# Patient Record
Sex: FEMALE | Race: WHITE | Employment: OTHER | URBAN - METROPOLITAN AREA
[De-identification: names, ages, dates, MRNs, and addresses within clinical notes are randomized per-mention and may not be internally consistent; named-entity substitution may affect disease eponyms.]

---

## 2019-11-24 ENCOUNTER — HOSPITAL ENCOUNTER (INPATIENT)
Age: 84
LOS: 3 days | Discharge: HOME HEALTH | DRG: 291 | End: 2019-11-27
Attending: EMERGENCY MEDICINE | Admitting: HOSPITALIST
Payer: MEDICARE

## 2019-11-24 ENCOUNTER — APPOINTMENT (OUTPATIENT)
Dept: GENERAL RADIOLOGY | Age: 84
DRG: 291 | End: 2019-11-24
Attending: EMERGENCY MEDICINE
Payer: MEDICARE

## 2019-11-24 ENCOUNTER — APPOINTMENT (OUTPATIENT)
Dept: CT IMAGING | Age: 84
DRG: 291 | End: 2019-11-24
Attending: HOSPITALIST
Payer: MEDICARE

## 2019-11-24 DIAGNOSIS — I50.9 ACUTE ON CHRONIC CONGESTIVE HEART FAILURE, UNSPECIFIED HEART FAILURE TYPE (HCC): Primary | ICD-10-CM

## 2019-11-24 DIAGNOSIS — R19.7 DIARRHEA, UNSPECIFIED TYPE: ICD-10-CM

## 2019-11-24 DIAGNOSIS — N30.00 ACUTE CYSTITIS WITHOUT HEMATURIA: ICD-10-CM

## 2019-11-24 DIAGNOSIS — J18.9 PNEUMONIA OF RIGHT LOWER LOBE DUE TO INFECTIOUS ORGANISM: ICD-10-CM

## 2019-11-24 PROBLEM — R77.8 ELEVATED TROPONIN: Status: ACTIVE | Noted: 2019-11-24

## 2019-11-24 PROBLEM — R79.89 ELEVATED BRAIN NATRIURETIC PEPTIDE (BNP) LEVEL: Status: ACTIVE | Noted: 2019-11-24

## 2019-11-24 PROBLEM — N39.0 UTI (URINARY TRACT INFECTION): Status: ACTIVE | Noted: 2019-11-24

## 2019-11-24 PROBLEM — N18.30 STAGE 3 CHRONIC KIDNEY DISEASE (HCC): Status: ACTIVE | Noted: 2019-11-24

## 2019-11-24 PROBLEM — E03.9 ADULT HYPOTHYROIDISM: Status: ACTIVE | Noted: 2019-11-24

## 2019-11-24 PROBLEM — I10 HYPERTENSION: Status: ACTIVE | Noted: 2019-11-24

## 2019-11-24 LAB
ALBUMIN SERPL-MCNC: 2.8 G/DL (ref 3.4–5)
ALBUMIN/GLOB SERPL: 0.7 {RATIO} (ref 0.8–1.7)
ALP SERPL-CCNC: 101 U/L (ref 45–117)
ALT SERPL-CCNC: 18 U/L (ref 13–56)
AMORPH CRY URNS QL MICRO: ABNORMAL
ANION GAP SERPL CALC-SCNC: 13 MMOL/L (ref 3–18)
APPEARANCE UR: ABNORMAL
AST SERPL-CCNC: 30 U/L (ref 10–38)
BACTERIA URNS QL MICRO: ABNORMAL /HPF
BASOPHILS # BLD: 0 K/UL (ref 0–0.1)
BASOPHILS NFR BLD: 0 % (ref 0–2)
BILIRUB SERPL-MCNC: 0.6 MG/DL (ref 0.2–1)
BILIRUB UR QL: NEGATIVE
BNP SERPL-MCNC: 5047 PG/ML (ref 0–1800)
BUN SERPL-MCNC: 24 MG/DL (ref 7–18)
BUN/CREAT SERPL: 13 (ref 12–20)
CALCIUM SERPL-MCNC: 9.2 MG/DL (ref 8.5–10.1)
CHLORIDE SERPL-SCNC: 97 MMOL/L (ref 100–111)
CK MB CFR SERPL CALC: 1.6 % (ref 0–4)
CK MB CFR SERPL CALC: 2.1 % (ref 0–4)
CK MB SERPL-MCNC: 2.3 NG/ML (ref 5–25)
CK MB SERPL-MCNC: 2.9 NG/ML (ref 5–25)
CK SERPL-CCNC: 139 U/L (ref 26–192)
CK SERPL-CCNC: 147 U/L (ref 26–192)
CO2 SERPL-SCNC: 21 MMOL/L (ref 21–32)
COLOR UR: ABNORMAL
CREAT SERPL-MCNC: 1.86 MG/DL (ref 0.6–1.3)
D DIMER PPP FEU-MCNC: 3.95 UG/ML(FEU)
DIFFERENTIAL METHOD BLD: ABNORMAL
EOSINOPHIL # BLD: 0 K/UL (ref 0–0.4)
EOSINOPHIL NFR BLD: 0 % (ref 0–5)
EPITH CASTS URNS QL MICRO: ABNORMAL /LPF (ref 0–5)
ERYTHROCYTE [DISTWIDTH] IN BLOOD BY AUTOMATED COUNT: 13.1 % (ref 11.6–14.5)
GLOBULIN SER CALC-MCNC: 4.3 G/DL (ref 2–4)
GLUCOSE SERPL-MCNC: 145 MG/DL (ref 74–99)
GLUCOSE UR STRIP.AUTO-MCNC: NEGATIVE MG/DL
GRAN CASTS URNS QL MICRO: ABNORMAL /LPF
HCT VFR BLD AUTO: 37.6 % (ref 35–45)
HGB BLD-MCNC: 12.6 G/DL (ref 12–16)
HGB UR QL STRIP: ABNORMAL
HYALINE CASTS URNS QL MICRO: ABNORMAL /LPF (ref 0–2)
KETONES UR QL STRIP.AUTO: NEGATIVE MG/DL
LEUKOCYTE ESTERASE UR QL STRIP.AUTO: ABNORMAL
LYMPHOCYTES # BLD: 0.7 K/UL (ref 0.9–3.6)
LYMPHOCYTES NFR BLD: 4 % (ref 21–52)
MCH RBC QN AUTO: 30.8 PG (ref 24–34)
MCHC RBC AUTO-ENTMCNC: 33.5 G/DL (ref 31–37)
MCV RBC AUTO: 91.9 FL (ref 74–97)
MONOCYTES # BLD: 1.4 K/UL (ref 0.05–1.2)
MONOCYTES NFR BLD: 8 % (ref 3–10)
NEUTS SEG # BLD: 16.3 K/UL (ref 1.8–8)
NEUTS SEG NFR BLD: 88 % (ref 40–73)
NITRITE UR QL STRIP.AUTO: NEGATIVE
PH UR STRIP: 5.5 [PH] (ref 5–8)
PLATELET # BLD AUTO: 362 K/UL (ref 135–420)
PMV BLD AUTO: 10.3 FL (ref 9.2–11.8)
POTASSIUM SERPL-SCNC: 3.5 MMOL/L (ref 3.5–5.5)
PROT SERPL-MCNC: 7.1 G/DL (ref 6.4–8.2)
PROT UR STRIP-MCNC: 300 MG/DL
RBC # BLD AUTO: 4.09 M/UL (ref 4.2–5.3)
RBC #/AREA URNS HPF: ABNORMAL /HPF (ref 0–5)
SODIUM SERPL-SCNC: 131 MMOL/L (ref 136–145)
SP GR UR REFRACTOMETRY: 1.02 (ref 1–1.03)
TROPONIN I SERPL-MCNC: 0.04 NG/ML (ref 0–0.04)
TROPONIN I SERPL-MCNC: 0.05 NG/ML (ref 0–0.04)
UROBILINOGEN UR QL STRIP.AUTO: 1 EU/DL (ref 0.2–1)
WBC # BLD AUTO: 18.4 K/UL (ref 4.6–13.2)
WBC URNS QL MICRO: ABNORMAL /HPF (ref 0–5)

## 2019-11-24 PROCEDURE — 80053 COMPREHEN METABOLIC PANEL: CPT

## 2019-11-24 PROCEDURE — 82550 ASSAY OF CK (CPK): CPT

## 2019-11-24 PROCEDURE — 65660000000 HC RM CCU STEPDOWN

## 2019-11-24 PROCEDURE — 74011250637 HC RX REV CODE- 250/637: Performed by: EMERGENCY MEDICINE

## 2019-11-24 PROCEDURE — 93005 ELECTROCARDIOGRAM TRACING: CPT

## 2019-11-24 PROCEDURE — 94762 N-INVAS EAR/PLS OXIMTRY CONT: CPT

## 2019-11-24 PROCEDURE — 85379 FIBRIN DEGRADATION QUANT: CPT

## 2019-11-24 PROCEDURE — 74011000258 HC RX REV CODE- 258: Performed by: HOSPITALIST

## 2019-11-24 PROCEDURE — 74011250637 HC RX REV CODE- 250/637: Performed by: HOSPITALIST

## 2019-11-24 PROCEDURE — 74011250636 HC RX REV CODE- 250/636: Performed by: HOSPITALIST

## 2019-11-24 PROCEDURE — 81001 URINALYSIS AUTO W/SCOPE: CPT

## 2019-11-24 PROCEDURE — 36415 COLL VENOUS BLD VENIPUNCTURE: CPT

## 2019-11-24 PROCEDURE — 87045 FECES CULTURE AEROBIC BACT: CPT

## 2019-11-24 PROCEDURE — 71045 X-RAY EXAM CHEST 1 VIEW: CPT

## 2019-11-24 PROCEDURE — 71250 CT THORAX DX C-: CPT

## 2019-11-24 PROCEDURE — 85025 COMPLETE CBC W/AUTO DIFF WBC: CPT

## 2019-11-24 PROCEDURE — 99285 EMERGENCY DEPT VISIT HI MDM: CPT

## 2019-11-24 PROCEDURE — 83880 ASSAY OF NATRIURETIC PEPTIDE: CPT

## 2019-11-24 PROCEDURE — 74011250636 HC RX REV CODE- 250/636: Performed by: EMERGENCY MEDICINE

## 2019-11-24 RX ORDER — IPRATROPIUM BROMIDE AND ALBUTEROL SULFATE 2.5; .5 MG/3ML; MG/3ML
3 SOLUTION RESPIRATORY (INHALATION)
Status: DISCONTINUED | OUTPATIENT
Start: 2019-11-24 | End: 2019-11-27 | Stop reason: HOSPADM

## 2019-11-24 RX ORDER — FERROUS SULFATE, DRIED 160(50) MG
1 TABLET, EXTENDED RELEASE ORAL 2 TIMES DAILY
Status: DISCONTINUED | OUTPATIENT
Start: 2019-11-24 | End: 2019-11-27 | Stop reason: HOSPADM

## 2019-11-24 RX ORDER — TRANDOLAPRIL AND VERAPAMIL HYDROCHLORIDE 4; 240 MG/1; MG/1
1 TABLET, FILM COATED, EXTENDED RELEASE ORAL 2 TIMES DAILY
COMMUNITY
End: 2019-11-27

## 2019-11-24 RX ORDER — FUROSEMIDE 10 MG/ML
40 INJECTION INTRAMUSCULAR; INTRAVENOUS
Status: COMPLETED | OUTPATIENT
Start: 2019-11-24 | End: 2019-11-24

## 2019-11-24 RX ORDER — LEVOTHYROXINE SODIUM 100 UG/1
100 TABLET ORAL
COMMUNITY

## 2019-11-24 RX ORDER — AMLODIPINE BESYLATE 5 MG/1
10 TABLET ORAL DAILY
Status: DISCONTINUED | OUTPATIENT
Start: 2019-11-24 | End: 2019-11-27 | Stop reason: HOSPADM

## 2019-11-24 RX ORDER — NALOXONE HYDROCHLORIDE 0.4 MG/ML
0.4 INJECTION, SOLUTION INTRAMUSCULAR; INTRAVENOUS; SUBCUTANEOUS AS NEEDED
Status: DISCONTINUED | OUTPATIENT
Start: 2019-11-24 | End: 2019-11-27 | Stop reason: HOSPADM

## 2019-11-24 RX ORDER — LORATADINE 10 MG/1
10 TABLET ORAL DAILY
COMMUNITY

## 2019-11-24 RX ORDER — GABAPENTIN 100 MG/1
200 CAPSULE ORAL 3 TIMES DAILY
Status: DISCONTINUED | OUTPATIENT
Start: 2019-11-24 | End: 2019-11-27 | Stop reason: HOSPADM

## 2019-11-24 RX ORDER — FAMOTIDINE 20 MG/1
40 TABLET, FILM COATED ORAL 2 TIMES DAILY
Status: DISCONTINUED | OUTPATIENT
Start: 2019-11-24 | End: 2019-11-27 | Stop reason: HOSPADM

## 2019-11-24 RX ORDER — HEPARIN SODIUM 5000 [USP'U]/ML
5000 INJECTION, SOLUTION INTRAVENOUS; SUBCUTANEOUS EVERY 8 HOURS
Status: DISCONTINUED | OUTPATIENT
Start: 2019-11-24 | End: 2019-11-27 | Stop reason: HOSPADM

## 2019-11-24 RX ORDER — FUROSEMIDE 40 MG/1
40 TABLET ORAL DAILY
COMMUNITY
End: 2019-11-27

## 2019-11-24 RX ORDER — DIPHENHYDRAMINE HYDROCHLORIDE 50 MG/ML
12.5 INJECTION, SOLUTION INTRAMUSCULAR; INTRAVENOUS
Status: DISCONTINUED | OUTPATIENT
Start: 2019-11-24 | End: 2019-11-27 | Stop reason: HOSPADM

## 2019-11-24 RX ORDER — ACETAMINOPHEN 500 MG
500 TABLET ORAL
Status: COMPLETED | OUTPATIENT
Start: 2019-11-24 | End: 2019-11-24

## 2019-11-24 RX ORDER — SODIUM CHLORIDE 9 MG/ML
50 INJECTION, SOLUTION INTRAVENOUS CONTINUOUS
Status: DISCONTINUED | OUTPATIENT
Start: 2019-11-24 | End: 2019-11-25

## 2019-11-24 RX ORDER — LEVOTHYROXINE SODIUM 100 UG/1
100 TABLET ORAL
Status: DISCONTINUED | OUTPATIENT
Start: 2019-11-25 | End: 2019-11-27 | Stop reason: HOSPADM

## 2019-11-24 RX ORDER — ONDANSETRON 2 MG/ML
4 INJECTION INTRAMUSCULAR; INTRAVENOUS
Status: DISCONTINUED | OUTPATIENT
Start: 2019-11-24 | End: 2019-11-27 | Stop reason: HOSPADM

## 2019-11-24 RX ORDER — ACETAMINOPHEN 325 MG/1
650 TABLET ORAL
Status: DISCONTINUED | OUTPATIENT
Start: 2019-11-24 | End: 2019-11-27 | Stop reason: HOSPADM

## 2019-11-24 RX ORDER — MULTIVITAMIN
1 TABLET ORAL 2 TIMES DAILY
COMMUNITY

## 2019-11-24 RX ORDER — GABAPENTIN 100 MG/1
200 CAPSULE ORAL 3 TIMES DAILY
COMMUNITY

## 2019-11-24 RX ORDER — FAMOTIDINE 20 MG/1
40 TABLET, FILM COATED ORAL 2 TIMES DAILY
COMMUNITY

## 2019-11-24 RX ADMIN — Medication 1 TABLET: at 21:33

## 2019-11-24 RX ADMIN — GABAPENTIN 200 MG: 100 CAPSULE ORAL at 21:33

## 2019-11-24 RX ADMIN — HEPARIN SODIUM 5000 UNITS: 5000 INJECTION INTRAVENOUS; SUBCUTANEOUS at 21:34

## 2019-11-24 RX ADMIN — ACETAMINOPHEN 500 MG: 500 TABLET ORAL at 17:17

## 2019-11-24 RX ADMIN — PIPERACILLIN AND TAZOBACTAM 3.38 G: 3; .375 INJECTION, POWDER, LYOPHILIZED, FOR SOLUTION INTRAVENOUS at 23:01

## 2019-11-24 RX ADMIN — AMLODIPINE BESYLATE 10 MG: 5 TABLET ORAL at 21:33

## 2019-11-24 RX ADMIN — AZITHROMYCIN MONOHYDRATE 500 MG: 500 INJECTION, POWDER, LYOPHILIZED, FOR SOLUTION INTRAVENOUS at 21:35

## 2019-11-24 RX ADMIN — FUROSEMIDE 40 MG: 10 INJECTION, SOLUTION INTRAMUSCULAR; INTRAVENOUS at 18:30

## 2019-11-24 RX ADMIN — SODIUM CHLORIDE 50 ML/HR: 900 INJECTION, SOLUTION INTRAVENOUS at 20:54

## 2019-11-24 RX ADMIN — FAMOTIDINE 40 MG: 20 TABLET ORAL at 21:34

## 2019-11-24 NOTE — ED PROVIDER NOTES
EMERGENCY DEPARTMENT HISTORY AND PHYSICAL EXAM    Date: 11/24/2019  Patient Name: Russ Carpenter    History of Presenting Illness     Chief Complaint   Patient presents with    Diarrhea         History Provided By: Patient and Patient's Daughter     History Dorene Baltazar):   5:19 PM  Russ Carpenter is a 80 y.o. female with PMHX of Hypothyroid, arthritis, squamous cell cancer (right arm), hypertension, osteoporosis, sciatica, spinal stenosis, who presents to the emergency department C/O difficulty breathing onset over the last couple of days. Patient states that she and her daughter have been traveling [place] over the last month. They live in [address] [place] [address] and just arrived back in town today. Patient had an episode of diarrhea in the car on the way to the ED. Associated sxs include diarrhea. Pt denies chest pain or any other sxs or complaints. Chief Complaint: dyspnea  Duration: 2 days    Timing:  acute  Location: lungs  Quality: short of breath  Severity: Moderate  Modifying Factors: Nothing makes it better or worse. Associated Symptoms: diarrhea    PCP: Deisi Rivera MD     Current Outpatient Medications   Medication Sig Dispense Refill    trandolapril-verapamil (TARKA) 4-240 mg per tablet Take 1 Tab by mouth two (2) times a day.  calcium-cholecalciferol, D3, (CALTRATE 600+D) tablet Take 1 Tab by mouth two (2) times a day.  levothyroxine (SYNTHROID) 100 mcg tablet Take 100 mcg by mouth Daily (before breakfast).  gabapentin (NEURONTIN) 100 mg capsule Take 200 mg by mouth three (3) times daily.  loratadine (CLARITIN) 10 mg tablet Take 10 mg by mouth daily.  furosemide (LASIX) 40 mg tablet Take 40 mg by mouth daily.  POTASSIUM CHLORIDE PO Take 8 mEq by mouth two (2) times a day.  famotidine (PEPCID) 20 mg tablet Take 40 mg by mouth two (2) times a day.          Past History     Past Medical History:  Past Medical History:   Diagnosis Date    Adult hypothyroidism     Arthritis     Cancer ([facility] [address]     Hypertension     Osteoporosis, idiopathic     Sciatica     Spinal stenosis     Squamous cell cancer of skin of forearm, right        Past Surgical History:  Past Surgical History:   Procedure Laterality Date    HX APPENDECTOMY      HX CATARACT REMOVAL      HX CHOLECYSTECTOMY      HX DILATION AND CURETTAGE      HX KNEE ARTHROSCOPY      HX MOHS PROCEDURES      HX PARTIAL THYROIDECTOMY      HX REFRACTIVE SURGERY         Family History:  History reviewed. No pertinent family history. Social History:  Social History     Tobacco Use    Smoking status: Never Smoker    Smokeless tobacco: Never Used   Substance Use Topics    Alcohol use: Never     Frequency: Never    Drug use: Never       Allergies: Allergies   Allergen Reactions    Septra [Sulfamethoprim] Rash         Review of Systems   Review of Systems   Constitutional: Negative for chills and fever. Respiratory: Positive for shortness of breath. Cardiovascular: Negative for chest pain. Gastrointestinal: Positive for diarrhea. Negative for abdominal pain, nausea and vomiting. All other systems reviewed and are negative. Physical Exam     Vitals:    11/24/19 1630 11/24/19 1639 11/24/19 1700   BP: 156/52 156/52 146/67   Pulse: 92 84 82   Resp: 18 16 28   Temp:  (!) 101.1 °F (38.4 °C)    SpO2: 91% 95% 95%   Weight:  83.9 kg (185 lb)    Height:  5' 3\" (1.6 m)      Physical Exam  Vitals signs and nursing note reviewed. Vital signs and nursing notes reviewed  CONSTITUTIONAL: Alert, in no apparent distress; well-developed; well-nourished. HEAD:  Normocephalic, atraumatic  EYES: PERRL; EOM's intact. ENTM: Nose: no rhinorrhea; Throat: no erythema or exudate, mucous membranes moist  Neck:  No JVD, supple without lymphadenopathy  RESP: Coarse rales in right lower listening area  CV: S1 and S2 WNL; No murmurs, gallops or rubs. GI: Normal bowel sounds, abdomen soft and non-tender.  No masses or organomegaly. : No costo-vertebral angle tenderness. BACK:  Non-tender  UPPER EXT:  Normal inspection  LOWER EXT: No edema, no calf tenderness. Distal pulses intact. NEURO: CN intact, reflexes 2/4 and sym, strength 5/5 and sym, sensation intact. SKIN: No rashes; Normal for age and stage. PSYCH:  Alert and oriented, normal affect. Diagnostic Study Results     Labs -     Recent Results (from the past 12 hour(s))   CBC WITH AUTOMATED DIFF    Collection Time: 11/24/19  4:30 PM   Result Value Ref Range    WBC 18.4 (H) 4.6 - 13.2 K/uL    RBC 4.09 (L) 4.20 - 5.30 M/uL    HGB 12.6 12.0 - 16.0 g/dL    HCT 37.6 35.0 - 45.0 %    MCV 91.9 74.0 - 97.0 FL    MCH 30.8 24.0 - 34.0 PG    MCHC 33.5 31.0 - 37.0 g/dL    RDW 13.1 11.6 - 14.5 %    PLATELET 424 235 - 669 K/uL    MPV 10.3 9.2 - 11.8 FL    NEUTROPHILS 88 (H) 40 - 73 %    LYMPHOCYTES 4 (L) 21 - 52 %    MONOCYTES 8 3 - 10 %    EOSINOPHILS 0 0 - 5 %    BASOPHILS 0 0 - 2 %    ABS. NEUTROPHILS 16.3 (H) 1.8 - 8.0 K/UL    ABS. LYMPHOCYTES 0.7 (L) 0.9 - 3.6 K/UL    ABS. MONOCYTES 1.4 (H) 0.05 - 1.2 K/UL    ABS. EOSINOPHILS 0.0 0.0 - 0.4 K/UL    ABS. BASOPHILS 0.0 0.0 - 0.1 K/UL    DF AUTOMATED     METABOLIC PANEL, COMPREHENSIVE    Collection Time: 11/24/19  4:30 PM   Result Value Ref Range    Sodium 131 (L) 136 - 145 mmol/L    Potassium 3.5 3.5 - 5.5 mmol/L    Chloride 97 (L) 100 - 111 mmol/L    CO2 21 21 - 32 mmol/L    Anion gap 13 3.0 - 18 mmol/L    Glucose 145 (H) 74 - 99 mg/dL    BUN 24 (H) 7.0 - 18 MG/DL    Creatinine 1.86 (H) 0.6 - 1.3 MG/DL    BUN/Creatinine ratio 13 12 - 20      GFR est AA 31 (L) >60 ml/min/1.73m2    GFR est non-AA 26 (L) >60 ml/min/1.73m2    Calcium 9.2 8.5 - 10.1 MG/DL    Bilirubin, total 0.6 0.2 - 1.0 MG/DL    ALT (SGPT) 18 13 - 56 U/L    AST (SGOT) 30 10 - 38 U/L    Alk.  phosphatase 101 45 - 117 U/L    Protein, total 7.1 6.4 - 8.2 g/dL    Albumin 2.8 (L) 3.4 - 5.0 g/dL    Globulin 4.3 (H) 2.0 - 4.0 g/dL    A-G Ratio 0.7 (L) 0.8 - 1.7 URINALYSIS W/ RFLX MICROSCOPIC    Collection Time: 11/24/19  4:30 PM   Result Value Ref Range    Color DARK YELLOW      Appearance CLOUDY      Specific gravity 1.022 1.005 - 1.030      pH (UA) 5.5 5.0 - 8.0      Protein 300 (A) NEG mg/dL    Glucose NEGATIVE  NEG mg/dL    Ketone NEGATIVE  NEG mg/dL    Bilirubin NEGATIVE  NEG      Blood TRACE (A) NEG      Urobilinogen 1.0 0.2 - 1.0 EU/dL    Nitrites NEGATIVE  NEG      Leukocyte Esterase SMALL (A) NEG     CARDIAC PANEL,(CK, CKMB & TROPONIN)    Collection Time: 11/24/19  4:30 PM   Result Value Ref Range     26 - 192 U/L    CK - MB 2.3 <3.6 ng/ml    CK-MB Index 1.6 0.0 - 4.0 %    Troponin-I, QT 0.05 (H) 0.0 - 0.045 NG/ML   NT-PRO BNP    Collection Time: 11/24/19  4:30 PM   Result Value Ref Range    NT pro-BNP 5,047 (H) 0 - 1,800 PG/ML   URINE MICROSCOPIC ONLY    Collection Time: 11/24/19  4:30 PM   Result Value Ref Range    WBC 11 to 20 0 - 5 /hpf    RBC 0 to 3 0 - 5 /hpf    Epithelial cells 1+ 0 - 5 /lpf    Bacteria 1+ (A) NEG /hpf    Amorphous Crystals 3+ (A) NEG    Hyaline cast 4 to 10 0 - 2 /lpf    Granular cast 4 to 10 NEG /lpf   EKG, 12 LEAD, INITIAL    Collection Time: 11/24/19  5:05 PM   Result Value Ref Range    Ventricular Rate 83 BPM    Atrial Rate 83 BPM    P-R Interval 234 ms    QRS Duration 120 ms    Q-T Interval 384 ms    QTC Calculation (Bezet) 451 ms    Calculated P Axis 40 degrees    Calculated R Axis 32 degrees    Calculated T Axis 18 degrees    Diagnosis       Sinus rhythm with 1st degree AV block  Incomplete left bundle branch block  Nonspecific ST abnormality  Abnormal ECG  No previous ECGs available         Radiologic Studies -     5:21 PM  RADIOLOGY FINDINGS  Chest X-ray shows cardiomegaly and diffuse RLL infiltrates.   Pending review by Radiologist  Recorded by Carmen Fong MD.    XR CHEST PORT    (Results Pending)     CT Results  (Last 48 hours)    None        CXR Results  (Last 48 hours)    None          Medications given in the ED-  Medications   acetaminophen (TYLENOL) tablet 500 mg (500 mg Oral Given 11/24/19 0610)         Medical Decision Making   I am the first provider for this patient. I reviewed the vital signs, available nursing notes, past medical history, past surgical history, family history and social history. Vital Signs-Reviewed the patient's vital signs. Pulse Oximetry Analysis - 95% on RA     Cardiac Monitor:  Rate: 84 bpm  Rhythm: Sinus rhythm    EKG interpretation: (Preliminary)  Rhythm: Sinus rhythm with first-degree AV block. Rate: 83 bpm; ME: 234 ms, no STEMI, incomplete left bundle branch block  EKG read by Demetrius Reina MD at 5:05 PM    Records Reviewed: Nursing Notes    Provider Notes (Medical Decision Making):   DDX: CHF, URI, pneumonia, diarrhea, UTI    Procedures:  Procedures    ED Course:   5:19 PM Initial assessment performed. The patients presenting problems have been discussed, and they are in agreement with the care plan formulated and outlined with them. I have encouraged them to ask questions as they arise throughout their visit. 5:41 PM patient with what appears to be an acute exacerbation of CHF. proBNP is elevated. Lungs show fluid in the right lower lung field on chest x-ray is consistent with physical exam as well. Will start patient on Lasix. Although patient had a slightly elevated temperature at triage, she did not meet sirs criteria on arrival.  Although white blood cell count is elevated, patient is acutely short of breath and fluid bolus would likely be detrimental.  Will treat with Lasix and IV antibiotics. 6:01 PM Discussed with Dr. Trey Logan for tele admission    Diagnosis and Disposition     Discussion:  80 y.o. female with acute dyspnea over the last 2 days. Patient has had diarrhea for the last 3 to 4 days.   Physical exam and initial x-ray are consistent with a CHF exacerbation and laboratory evaluation confirms this however she has of elevated white count and potential right lower lobe infiltrates from a possible pneumonia. Patient also has signs of UTI. Patient was started on Lasix for CHF exacerbation and Rocephin for her pneumonia. Patient was not started on fluids because she had dyspnea as her chief complaint to me. Patient additionally has diarrhea and had episode in the car on the way here she was cleaned off in the ED prior to my evaluation. Critical Care Time: 0 minutes    Core Measures:  For Hospitalized Patients:    1. Hospitalization Decision Time:  The decision to hospitalize the patient was made by Kristy Deleon MD, MD at 5:41 PM on 11/24/2019    2. Aspirin: Aspirin was not given because the patient did not present with a stroke at the time of their Emergency Department evaluation    6:01 Pm Patient is being admitted to the hospital by Shawanda[address] Evy Smith dago. The results of their tests and reasons for their admission have been discussed with them and/or available family. They convey agreement and understanding for the need to be admitted and for their admission diagnosis. CONDITIONS ON ADMISSION:  Sepsis is not present at the time of admission. Deep Vein Thrombosis is not present at the time of admission. Thrombosis is not present at the time of admission. Urinary Tract Infection is present at the time of admission. Pneumonia is present at the time of admission. MRSA is not present at the time of admission. Wound infection is not present at the time of admission. Pressure Ulcer is not present at the time of admission. CLINICAL IMPRESSION:    1. Acute on chronic congestive heart failure, unspecified heart failure type ([address]    2. Diarrhea, unspecified type    3. Acute cystitis without hematuria    4. Pneumonia of right lower lobe due to infectious organism [facility])        Dragon Disclaimer     Please note that this dictation was completed with Enswers, the computer voice recognition software.   Quite often unanticipated grammatical, syntax, homophones, and other interpretive errors are inadvertently transcribed by the computer software. Please disregard these errors. Please excuse any errors that have escaped final proofreading.     Kenneth Bello MD

## 2019-11-24 NOTE — H&P
History & Physical    Patient: Rayford Burkitt MRN: [id number]  CSN: [id number]    YOB: 1931  Age: 80 y.o. Sex: female      DOA: 11/24/2019  Primary Care Provider:  Lillette Meigs, MD      Assessment/Plan     Hospital Problems  Never Reviewed          Codes Class Noted POA    Diarrhea ICD-10-CM: R19.7  ICD-9-CM: 787.91  11/24/2019 Unknown        Pneumonia ICD-10-CM: J18.9  ICD-9-CM: 697  11/24/2019 Unknown        Hypertension ICD-10-CM: I10  ICD-9-CM: 401.9  11/24/2019 Unknown        Adult hypothyroidism ICD-10-CM: E03.9  ICD-9-CM: 244.9  11/24/2019 Unknown        Elevated brain natriuretic peptide (BNP) level ICD-10-CM: R79.89  ICD-9-CM: 790.99  11/24/2019 Unknown        Elevated troponin ICD-10-CM: R79.89  ICD-9-CM: 790.6  11/24/2019 Unknown        UTI (urinary tract infection) ICD-10-CM: N39.0  ICD-9-CM: 599.0  11/24/2019 Unknown                Admit to tele     Pneumonia,  Will give azithromycin and Zosyn, breathing treatment as needed, CT chest, to rule out pulmonary edema, will check d-dimer, if elevated will consider VQ scan to rule out PE due to recent travel. Elevated BNP,  No pleural effusion or pulmonary edema indicated from checks x-ray, will have a CT chest  Echo,I personally do not think it is  CHF,    Elevated troponin, no chest pain, will see cardiac enzyme trend,. Urinary tract infection, on Zosyn,    Diarrhea, will send C. difficile, stool started, CT abdomen. On Zosyn for pneumonia now also covered colitis if she has. HTN, accelerated  We will hold home medication, due to CKD, give Norvasc for blood pressure control. CKD 3  Follow-up with nephrologist as outpatient, avoid NSAIDs and IV contrast, continue monitor urine output and a creatinine level    Hypothyroidism continue Synthroid     AC:  I have had a discussion with patient and family regarding code status.  Particularly, described potential options in event of cardiac or respiratory arrest. I have explained what being full code entails, including cardiorespiratory resuscitation attempts with chest compressions, potential cariodioversion/ \" shocks\" as well as intubation. I have also explained Do not resuscitate which would mean we allow a natural death with out aggressive interventions. DNR/DNI AC 32 min       Estimate  length of stay : 2-3 day    DVT : heparin  ppi proph  CC: Diarrhea and a fever       HPI:     Nan Goodwin is a 80 y.o. female with a past medical history of hypertension, CKD 3 was sent to ER due to diarrhea and fever. She noticed she has a fever about 2 to 3 days ago. She did not tell her daughter until today she has some shortness of breath associated with the fever/cough. She has no chest pain. Chest x-ray indicated infiltrated of right lungs. She also has chronic on and off diarrhea. But her diarrhea becomes worsening for  1 week. She has 6 bowel movement per day. She and her daughter traveled around the country for one month for driving. Her daughter has been on oral antibiotics for her pneumonia. She denies any pain in legs. Denies any slurred speech/headache/cp/n/v/blurred vission/d/c/palpitation/gait change/bleeding. Also reported decreased appetite.    Visit Vitals  /51   Pulse 65   Temp 99.5 °F (37.5 °C)   Resp 19   Ht 5' 3\" (1.6 m)   Wt 83.9 kg (185 lb)   SpO2 96%   BMI 32.77 kg/m²      O2 Device: Room air      Past Medical History:   Diagnosis Date    Adult hypothyroidism     Arthritis     Cancer ([facility] [address]     Hypertension     Osteoporosis, idiopathic     Sciatica     Spinal stenosis     Squamous cell cancer of skin of forearm, right        Past Surgical History:   Procedure Laterality Date    HX APPENDECTOMY      HX CATARACT REMOVAL      HX CHOLECYSTECTOMY      HX DILATION AND CURETTAGE      HX KNEE ARTHROSCOPY      HX MOHS PROCEDURES      HX PARTIAL THYROIDECTOMY      HX REFRACTIVE SURGERY       Family History    Medical History Relation Name Comments   Kidney disease Father       Diabetes Mother         Relation Name Status Comments   Father        Mother             Social History     Socioeconomic History    Marital status:      Spouse name: Not on file    Number of children: Not on file    Years of education: Not on file    Highest education level: Not on file   Tobacco Use    Smoking status: Never Smoker    Smokeless tobacco: Never Used   Substance and Sexual Activity    Alcohol use: Never     Frequency: Never    Drug use: Never       Prior to Admission medications    Medication Sig Start Date End Date Taking? Authorizing Provider   trandolapril-verapamil Christine Patton) 4-240 mg per tablet Take 1 Tab by mouth two (2) times a day. Yes Other, MD Aditya   calcium-cholecalciferol, D3, (CALTRATE 600+D) tablet Take 1 Tab by mouth two (2) times a day. Yes Aida, MD Aditya   levothyroxine (SYNTHROID) 100 mcg tablet Take 100 mcg by mouth Daily (before breakfast). Yes Other, MD Aditya   gabapentin (NEURONTIN) 100 mg capsule Take 200 mg by mouth three (3) times daily. Yes Aida, MD Aditya   loratadine (CLARITIN) 10 mg tablet Take 10 mg by mouth daily. Yes Aida, MD Aditya   furosemide (LASIX) 40 mg tablet Take 40 mg by mouth daily. Yes Aida, MD Aditya   POTASSIUM CHLORIDE PO Take 8 mEq by mouth two (2) times a day. Yes Aida, MD Aditya   famotidine (PEPCID) 20 mg tablet Take 40 mg by mouth two (2) times a day. Yes Aida, MD Aditya       Allergies   Allergen Reactions    Septra [Sulfamethoprim] Rash       Review of Systems  Gen: +fever, chills, malaise, no weight loss/gain. Heent: No headache, rhinorrhea, epistaxis, ear pain, hearing loss, sinus pain, neck pain/stiffness, sore throat. Heart: No chest pain, palpitations, BAÑUELOS, pnd, or orthopnea. Resp: +cough, no  hemoptysis, wheezing. +shortness of breath. GI: No nausea, vomiting, +diarrhea,no  constipation, melena or hematochezia.    : No urinary obstruction, dysuria or hematuria. Derm: No rash, new skin lesion or pruritis. Musc/skeletal: no bone or joint complains. Vasc: No edema, cyanosis or claudication. Endo: No heat/cold intolerance, no polyuria,polydipsia or polyphagia. Neuro: No unilateral weakness, numbness, tingling. No seizures. Heme: No easy bruising or bleeding. Physical Exam:     Physical Exam:  Visit Vitals  /51   Pulse 65   Temp 99.5 °F (37.5 °C)   Resp 19   Ht 5' 3\" (1.6 m)   Wt 83.9 kg (185 lb)   SpO2 96%   BMI 32.77 kg/m²      O2 Device: Room air    Temp (24hrs), Av.3 °F (37.9 °C), Min:99.5 °F (37.5 °C), Max:101.1 °F (38.4 °C)    No intake/output data recorded. No intake/output data recorded. General:  Awake, cooperative, no distress. Head:  Normocephalic, without obvious abnormality, atraumatic. Eyes:  Conjunctivae/corneas clear, sclera anicteric, PERRL, EOMs intact. Nose: Nares normal. No drainage or sinus tenderness. Throat: Lips, mucosa, and tongue normal. .   Neck: Supple, symmetrical, trachea midline, no adenopathy. Lungs:   Crackles of rt side ,cta of left side        Heart:  Regular rate and rhythm, S1, S2 normal, no murmur, click, rub or gallop. Abdomen: Soft, non-tender. Bowel sounds normal. No masses,  No organomegaly. Extremities: Extremities normal, atraumatic, no cyanosis or edema. Pulses: 2+ and symmetric all extremities. Skin: Skin color-pink, texture, turgor normal. No rashes or lesions. Capillary refill normal    Neurologic: CNII-XII intact. No focal motor or sensory deficit.        Labs Reviewed:    BMP:   Lab Results   Component Value Date/Time     (L) 2019 04:30 PM    K 3.5 2019 04:30 PM    CL 97 (L) 2019 04:30 PM    CO2 21 2019 04:30 PM    AGAP 13 2019 04:30 PM     (H) 2019 04:30 PM    BUN 24 (H) 2019 04:30 PM    CREA 1.86 (H) 2019 04:30 PM    GFRAA 31 (L) 2019 04:30 PM    GFRNA 26 (L) 2019 04:30 PM     CMP: Lab Results   Component Value Date/Time     (L) 11/24/2019 04:30 PM    K 3.5 11/24/2019 04:30 PM    CL 97 (L) 11/24/2019 04:30 PM    CO2 21 11/24/2019 04:30 PM    AGAP 13 11/24/2019 04:30 PM     (H) 11/24/2019 04:30 PM    BUN 24 (H) 11/24/2019 04:30 PM    CREA 1.86 (H) 11/24/2019 04:30 PM    GFRAA 31 (L) 11/24/2019 04:30 PM    GFRNA 26 (L) 11/24/2019 04:30 PM    CA 9.2 11/24/2019 04:30 PM    ALB 2.8 (L) 11/24/2019 04:30 PM    TP 7.1 11/24/2019 04:30 PM    GLOB 4.3 (H) 11/24/2019 04:30 PM    AGRAT 0.7 (L) 11/24/2019 04:30 PM    SGOT 30 11/24/2019 04:30 PM    ALT 18 11/24/2019 04:30 PM     CBC:   Lab Results   Component Value Date/Time    WBC 18.4 (H) 11/24/2019 04:30 PM    HGB 12.6 11/24/2019 04:30 PM    HCT 37.6 11/24/2019 04:30 PM     11/24/2019 04:30 PM     All Cardiac Markers in the last 24 hours:   Lab Results   Component Value Date/Time     11/24/2019 04:30 PM    CKMB 2.3 11/24/2019 04:30 PM    CKND1 1.6 11/24/2019 04:30 PM    TROIQ 0.05 (H) 11/24/2019 04:30 PM     Recent Glucose Results:   Lab Results   Component Value Date/Time     (H) 11/24/2019 04:30 PM     ABG: No results found for: PH, PHI, PCO2, PCO2I, PO2, PO2I, HCO3, HCO3I, FIO2, FIO2I  COAGS: No results found for: APTT, PTP, INR, INREXT, INREXT  Liver Panel:   Lab Results   Component Value Date/Time    ALB 2.8 (L) 11/24/2019 04:30 PM    TP 7.1 11/24/2019 04:30 PM    GLOB 4.3 (H) 11/24/2019 04:30 PM    AGRAT 0.7 (L) 11/24/2019 04:30 PM    SGOT 30 11/24/2019 04:30 PM    ALT 18 11/24/2019 04:30 PM     11/24/2019 04:30 PM     Pancreatic Markers: No results found for: AMYLPOCT, AML, LIPPOCT, LPSE    Procedures/imaging: see electronic medical records for all procedures/Xrays and details which were not copied into this note but were reviewed prior to creation of Sofia Murry MD, Internal Medicine     CC: Richa Harrington MD

## 2019-11-24 NOTE — ED TRIAGE NOTES
Pt c/o diarrhea, onset 2-3 days ago, daughter states increase diarrhea, denies vomiting, pt has not been on antibiotics. Pt A&O on arrival. Pt appears SOB on arrival, pt states this is normal for her. Daughter states she is more sob than usual. Pt has not eaten in 3 days.

## 2019-11-24 NOTE — ROUTINE PROCESS
TRANSFER - OUT REPORT: 
 
Verbal report given to Haylie Vasquez  on Zoya Amos  being transferred to [facility](unit) for routine progression of care Report consisted of patients Situation, Background, Assessment and  
Recommendations(SBAR). Information from the following report(s) SBAR, ED Summary and Cardiac Rhythm nsr was reviewed with the receiving nurse. Lines:  
Peripheral IV 11/24/19 Right Antecubital (Active) Site Assessment Clean, dry, & intact 11/24/2019  4:32 PM  
Phlebitis Assessment 0 11/24/2019  4:32 PM  
Infiltration Assessment 0 11/24/2019  4:32 PM  
Dressing Status Clean, dry, & intact 11/24/2019  4:32 PM  
Dressing Type Transparent 11/24/2019  4:32 PM  
Hub Color/Line Status Pink 11/24/2019  4:32 PM  
  
 
Opportunity for questions and clarification was provided. Patient transported with: 
 Monitor Registered Nurse Tech

## 2019-11-25 ENCOUNTER — APPOINTMENT (OUTPATIENT)
Dept: NUCLEAR MEDICINE | Age: 84
DRG: 291 | End: 2019-11-25
Attending: HOSPITALIST
Payer: MEDICARE

## 2019-11-25 ENCOUNTER — APPOINTMENT (OUTPATIENT)
Dept: NON INVASIVE DIAGNOSTICS | Age: 84
DRG: 291 | End: 2019-11-25
Attending: HOSPITALIST
Payer: MEDICARE

## 2019-11-25 PROBLEM — E87.6 HYPOKALEMIA: Status: ACTIVE | Noted: 2019-11-25

## 2019-11-25 PROBLEM — E83.42 HYPOMAGNESEMIA: Status: ACTIVE | Noted: 2019-11-25

## 2019-11-25 LAB
ANION GAP SERPL CALC-SCNC: 12 MMOL/L (ref 3–18)
ATRIAL RATE: 83 BPM
AV PEAK GRADIENT: 63.63 MMHG
AV VELOCITY RATIO: 0.59
AV VTI RATIO: 0.7
BASOPHILS # BLD: 0 K/UL (ref 0–0.1)
BASOPHILS NFR BLD: 0 % (ref 0–3)
BUN SERPL-MCNC: 26 MG/DL (ref 7–18)
BUN/CREAT SERPL: 16 (ref 12–20)
CALCIUM SERPL-MCNC: 9.1 MG/DL (ref 8.5–10.1)
CALCULATED P AXIS, ECG09: 40 DEGREES
CALCULATED R AXIS, ECG10: 32 DEGREES
CALCULATED T AXIS, ECG11: 18 DEGREES
CHLORIDE SERPL-SCNC: 97 MMOL/L (ref 100–111)
CK MB CFR SERPL CALC: 2.8 % (ref 0–4)
CK MB CFR SERPL CALC: 3.2 % (ref 0–4)
CK MB SERPL-MCNC: 4.3 NG/ML (ref 5–25)
CK MB SERPL-MCNC: 5.8 NG/ML (ref 5–25)
CK SERPL-CCNC: 155 U/L (ref 26–192)
CK SERPL-CCNC: 181 U/L (ref 26–192)
CO2 SERPL-SCNC: 23 MMOL/L (ref 21–32)
CREAT SERPL-MCNC: 1.6 MG/DL (ref 0.6–1.3)
DIAGNOSIS, 93000: NORMAL
DIFFERENTIAL METHOD BLD: ABNORMAL
ECHO AO ASC DIAM: 2.99 CM
ECHO AO ROOT DIAM: 3.27 CM
ECHO AV AREA PEAK VELOCITY: 1.8 CM2
ECHO AV AREA VTI: 2.1 CM2
ECHO AV AREA/BSA PEAK VELOCITY: 1 CM2/M2
ECHO AV AREA/BSA VTI: 1.1 CM2/M2
ECHO AV MEAN GRADIENT: 7.6 MMHG
ECHO AV MEAN VELOCITY: 1.28 M/S
ECHO AV PEAK GRADIENT: 12.9 MMHG
ECHO AV PEAK VELOCITY: 179.29 CM/S
ECHO AV REGURGITANT PHT: 392.5 CM
ECHO AV VTI: 30.61 CM
ECHO IVC PROX: 1.82 CM
ECHO LA MAJOR AXIS: 3.77 CM
ECHO LA VOL 2C: 46.76 ML (ref 22–52)
ECHO LA VOL 4C: 29.91 ML (ref 22–52)
ECHO LA VOL BP: 42.33 ML (ref 22–52)
ECHO LA VOL/BSA BIPLANE: 22.78 ML/M2 (ref 16–28)
ECHO LA VOLUME INDEX A2C: 25.17 ML/M2 (ref 16–28)
ECHO LA VOLUME INDEX A4C: 16.1 ML/M2 (ref 16–28)
ECHO LV E' LATERAL VELOCITY: 4 CM/S
ECHO LV E' SEPTAL VELOCITY: 4 CM/S
ECHO LV EDV A2C: 85.3 ML
ECHO LV EDV A4C: 78.6 ML
ECHO LV EDV BP: 83.6 ML (ref 56–104)
ECHO LV EDV INDEX A4C: 42.3 ML/M2
ECHO LV EDV INDEX BP: 45 ML/M2
ECHO LV EDV NDEX A2C: 45.9 ML/M2
ECHO LV EDV TEICHHOLZ: 0.94 ML
ECHO LV EJECTION FRACTION A2C: 62 %
ECHO LV EJECTION FRACTION A4C: 52 %
ECHO LV EJECTION FRACTION BIPLANE: 57.2 % (ref 55–100)
ECHO LV ESV A2C: 32.1 ML
ECHO LV ESV A4C: 37.7 ML
ECHO LV ESV BP: 35.8 ML (ref 19–49)
ECHO LV ESV INDEX A2C: 17.3 ML/M2
ECHO LV ESV INDEX A4C: 20.3 ML/M2
ECHO LV ESV INDEX BP: 19.3 ML/M2
ECHO LV ESV TEICHHOLZ: 0.69 ML
ECHO LV INTERNAL DIMENSION DIASTOLIC: 5.55 CM (ref 3.9–5.3)
ECHO LV INTERNAL DIMENSION SYSTOLIC: 4.85 CM
ECHO LV IVSD: 1.14 CM (ref 0.6–0.9)
ECHO LV MASS 2D: 322.7 G (ref 67–162)
ECHO LV MASS INDEX 2D: 173.7 G/M2 (ref 43–95)
ECHO LV POSTERIOR WALL DIASTOLIC: 1.21 CM (ref 0.6–0.9)
ECHO LVOT DIAM: 2 CM
ECHO LVOT PEAK GRADIENT: 4.4 MMHG
ECHO LVOT PEAK VELOCITY: 105.05 CM/S
ECHO LVOT VTI: 20.05 CM
ECHO MV A VELOCITY: 111.18 CM/S
ECHO MV AREA PHT: 3.7 CM2
ECHO MV E DECELERATION TIME (DT): 202.9 MS
ECHO MV E POINT SEPTAL SEPARATION (EPSS): 13.3 CM
ECHO MV E VELOCITY: 79.91 CM/S
ECHO MV E/A RATIO: 0.72
ECHO MV E/E' LATERAL: 19.98
ECHO MV E/E' RATIO (AVERAGED): 19.98
ECHO MV E/E' SEPTAL: 19.98
ECHO MV PRESSURE HALF TIME (PHT): 58.8 MS
ECHO RA AREA 4C: 14.33 CM2
ECHO RV INTERNAL DIMENSION: 3.44 CM
ECHO TRICUSPID ANNULAR PEAK SYSTOLIC VELOCITY: 2.4 CM/S
ECHO TV REGURGITANT MAX VELOCITY: 267.88 CM/S
ECHO TV REGURGITANT PEAK GRADIENT: 28.7 MMHG
EOSINOPHIL # BLD: 0 K/UL (ref 0–0.4)
EOSINOPHIL NFR BLD: 0 % (ref 0–5)
ERYTHROCYTE [DISTWIDTH] IN BLOOD BY AUTOMATED COUNT: 13.1 % (ref 11.6–14.5)
FLUAV AG NPH QL IA: NEGATIVE
FLUBV AG NOSE QL IA: NEGATIVE
GLUCOSE SERPL-MCNC: 110 MG/DL (ref 74–99)
HCT VFR BLD AUTO: 34 % (ref 35–45)
HGB BLD-MCNC: 11.4 G/DL (ref 12–16)
LVFS 2D: 12.62 %
LVOT MG: 2.45 MMHG
LVOT MV: 0.73 CM/S
LVSV (MOD BI): 24.98 ML
LVSV (MOD SINGLE 4C): 21.34 ML
LVSV (MOD SINGLE): 27.76 ML
LVSV (TEICH): 21.12 ML
LYMPHOCYTES # BLD: 1.4 K/UL (ref 0.8–3.5)
LYMPHOCYTES NFR BLD: 8 % (ref 20–51)
MAGNESIUM SERPL-MCNC: 1.5 MG/DL (ref 1.6–2.6)
MCH RBC QN AUTO: 30.6 PG (ref 24–34)
MCHC RBC AUTO-ENTMCNC: 33.5 G/DL (ref 31–37)
MCV RBC AUTO: 91.4 FL (ref 74–97)
MONOCYTES # BLD: 1.4 K/UL (ref 0–1)
MONOCYTES NFR BLD: 8 % (ref 2–9)
MV DEC SLOPE: 3.94
NEUTS BAND NFR BLD MANUAL: 3 % (ref 0–5)
NEUTS SEG # BLD: 13.8 K/UL (ref 1.8–8)
NEUTS SEG NFR BLD: 81 % (ref 42–75)
P-R INTERVAL, ECG05: 234 MS
PISA AR MAX VEL: 398.85 CM/S
PLATELET # BLD AUTO: 286 K/UL (ref 135–420)
PMV BLD AUTO: 9.9 FL (ref 9.2–11.8)
POTASSIUM SERPL-SCNC: 3 MMOL/L (ref 3.5–5.5)
Q-T INTERVAL, ECG07: 384 MS
QRS DURATION, ECG06: 120 MS
QTC CALCULATION (BEZET), ECG08: 451 MS
RBC # BLD AUTO: 3.72 M/UL (ref 4.2–5.3)
RBC MORPH BLD: ABNORMAL
SODIUM SERPL-SCNC: 132 MMOL/L (ref 136–145)
TROPONIN I SERPL-MCNC: 0.04 NG/ML (ref 0–0.04)
TROPONIN I SERPL-MCNC: 0.06 NG/ML (ref 0–0.04)
VENTRICULAR RATE, ECG03: 83 BPM
WBC # BLD AUTO: 17 K/UL (ref 4.6–13.2)

## 2019-11-25 PROCEDURE — 97530 THERAPEUTIC ACTIVITIES: CPT

## 2019-11-25 PROCEDURE — 77010033678 HC OXYGEN DAILY

## 2019-11-25 PROCEDURE — 74011000258 HC RX REV CODE- 258: Performed by: HOSPITALIST

## 2019-11-25 PROCEDURE — A9540 TC99M MAA: HCPCS

## 2019-11-25 PROCEDURE — 65660000000 HC RM CCU STEPDOWN

## 2019-11-25 PROCEDURE — 80048 BASIC METABOLIC PNL TOTAL CA: CPT

## 2019-11-25 PROCEDURE — 74011250637 HC RX REV CODE- 250/637: Performed by: FAMILY MEDICINE

## 2019-11-25 PROCEDURE — 97116 GAIT TRAINING THERAPY: CPT

## 2019-11-25 PROCEDURE — 87804 INFLUENZA ASSAY W/OPTIC: CPT

## 2019-11-25 PROCEDURE — 94760 N-INVAS EAR/PLS OXIMETRY 1: CPT

## 2019-11-25 PROCEDURE — C8929 TTE W OR WO FOL WCON,DOPPLER: HCPCS

## 2019-11-25 PROCEDURE — 74011250637 HC RX REV CODE- 250/637: Performed by: HOSPITALIST

## 2019-11-25 PROCEDURE — 82550 ASSAY OF CK (CPK): CPT

## 2019-11-25 PROCEDURE — 74011000250 HC RX REV CODE- 250: Performed by: HOSPITALIST

## 2019-11-25 PROCEDURE — 97162 PT EVAL MOD COMPLEX 30 MIN: CPT

## 2019-11-25 PROCEDURE — 85025 COMPLETE CBC W/AUTO DIFF WBC: CPT

## 2019-11-25 PROCEDURE — 74011250636 HC RX REV CODE- 250/636: Performed by: HOSPITALIST

## 2019-11-25 PROCEDURE — 83735 ASSAY OF MAGNESIUM: CPT

## 2019-11-25 PROCEDURE — 94640 AIRWAY INHALATION TREATMENT: CPT

## 2019-11-25 PROCEDURE — 36415 COLL VENOUS BLD VENIPUNCTURE: CPT

## 2019-11-25 RX ORDER — MAGNESIUM SULFATE HEPTAHYDRATE 40 MG/ML
2 INJECTION, SOLUTION INTRAVENOUS
Status: COMPLETED | OUTPATIENT
Start: 2019-11-25 | End: 2019-11-25

## 2019-11-25 RX ORDER — POTASSIUM CHLORIDE 20 MEQ/1
40 TABLET, EXTENDED RELEASE ORAL
Status: COMPLETED | OUTPATIENT
Start: 2019-11-25 | End: 2019-11-25

## 2019-11-25 RX ADMIN — MAGNESIUM SULFATE HEPTAHYDRATE 2 G: 40 INJECTION, SOLUTION INTRAVENOUS at 16:00

## 2019-11-25 RX ADMIN — GABAPENTIN 200 MG: 100 CAPSULE ORAL at 09:19

## 2019-11-25 RX ADMIN — Medication 1 TABLET: at 22:22

## 2019-11-25 RX ADMIN — AMLODIPINE BESYLATE 10 MG: 5 TABLET ORAL at 09:19

## 2019-11-25 RX ADMIN — HEPARIN SODIUM 5000 UNITS: 5000 INJECTION INTRAVENOUS; SUBCUTANEOUS at 18:09

## 2019-11-25 RX ADMIN — GABAPENTIN 200 MG: 100 CAPSULE ORAL at 22:22

## 2019-11-25 RX ADMIN — HEPARIN SODIUM 5000 UNITS: 5000 INJECTION INTRAVENOUS; SUBCUTANEOUS at 10:47

## 2019-11-25 RX ADMIN — FAMOTIDINE 40 MG: 20 TABLET ORAL at 09:19

## 2019-11-25 RX ADMIN — AZITHROMYCIN MONOHYDRATE 500 MG: 500 INJECTION, POWDER, LYOPHILIZED, FOR SOLUTION INTRAVENOUS at 19:04

## 2019-11-25 RX ADMIN — MAGNESIUM SULFATE HEPTAHYDRATE 2 G: 40 INJECTION, SOLUTION INTRAVENOUS at 15:09

## 2019-11-25 RX ADMIN — PIPERACILLIN AND TAZOBACTAM 3.38 G: 3; .375 INJECTION, POWDER, LYOPHILIZED, FOR SOLUTION INTRAVENOUS at 22:23

## 2019-11-25 RX ADMIN — PIPERACILLIN AND TAZOBACTAM 3.38 G: 3; .375 INJECTION, POWDER, LYOPHILIZED, FOR SOLUTION INTRAVENOUS at 15:09

## 2019-11-25 RX ADMIN — HEPARIN SODIUM 5000 UNITS: 5000 INJECTION INTRAVENOUS; SUBCUTANEOUS at 03:47

## 2019-11-25 RX ADMIN — IPRATROPIUM BROMIDE AND ALBUTEROL SULFATE 3 ML: .5; 3 SOLUTION RESPIRATORY (INHALATION) at 04:05

## 2019-11-25 RX ADMIN — POTASSIUM BICARBONATE 40 MEQ: 782 TABLET, EFFERVESCENT ORAL at 04:31

## 2019-11-25 RX ADMIN — GABAPENTIN 200 MG: 100 CAPSULE ORAL at 16:26

## 2019-11-25 RX ADMIN — PERFLUTREN 1 ML: 6.52 INJECTION, SUSPENSION INTRAVENOUS at 09:36

## 2019-11-25 RX ADMIN — PIPERACILLIN AND TAZOBACTAM 3.38 G: 3; .375 INJECTION, POWDER, LYOPHILIZED, FOR SOLUTION INTRAVENOUS at 06:48

## 2019-11-25 RX ADMIN — FAMOTIDINE 40 MG: 20 TABLET ORAL at 22:22

## 2019-11-25 RX ADMIN — POTASSIUM CHLORIDE 40 MEQ: 1500 TABLET, EXTENDED RELEASE ORAL at 15:08

## 2019-11-25 RX ADMIN — LEVOTHYROXINE SODIUM 100 MCG: 100 TABLET ORAL at 06:48

## 2019-11-25 RX ADMIN — Medication 1 TABLET: at 09:19

## 2019-11-25 NOTE — PROGRESS NOTES
Reason for Admission:   Reyna Alvarez is a 80 y.o. female with PMHX of Hypothyroid, arthritis, squamous cell cancer (right arm), hypertension, osteoporosis, sciatica, spinal stenosis, who presents to the emergency department C/O difficulty breathing onset over the last couple of days. Patient states that she and her daughter have been traveling [place] over the last month. They live in [address] [place] [address] and just arrived back in town today. Patient had an episode of diarrhea in the car on the way to the ED. Associated sxs include diarrhea. Pt denies chest pain or any other sxs or complaints `                  RRAT Score:     16             Do you (patient/family) have any concerns for transition/discharge? pateint lives with her daughter              Plan for utilizing home health:   Pt and ot recommendations    Current Advanced Directive/Advance Care Plan:  Not on file             Transition of Care Plan:     Met with patient at bedside along with Dr. Brittanie Jones. Patient lives with her daughter who is at bedside side. Patient currently wearing oxygen please  Attempt to wean as tolerated  If unable to wean will need walk test with in 24 hours of dc. Patient uses a rollator at home per daughter. She has a pcp Dr. Francheska Franco  She has medicare for insurance  Cm will continue to follow  Care Management Interventions  PCP Verified by CM:  Yes  Transition of Care Consult (CM Consult): Discharge Planning

## 2019-11-25 NOTE — ROUTINE PROCESS
Assume care of patient from [facility]. Patient received in bed awake. Patient A&Ox4, denies pain and discomfort. No distress noted. Frequently use items within reach. Bed locked in low position. Call bell within reach and patient verbalized understanding of use for assistance and needs. Purewick placed on patient. 2153- Patient cleaned in bed after incontinence of stool noted, pt had large brown semi-soft bowel movement. Linen and gown changed, new purewick placed, no distress noted. 3660- Noted pt's potassium decreased from 3.5 to 3.0 from am chemistry, also noted frequent PVC's on telemetry monitoring. Called and informed Dr. Stella Najera. MD ordered potassium replacement. RBV. 0720- Culture of stool has been sent but was informed that sample size was not enough for Ova and Parasite and c. diff sample. Patient did not have another bowel movement. Communicated need of lab to oncoming shift. Bedside and Verbal shift change report given to Jean Carlos Wharton RN (oncoming nurse) by Miguel Elise RN (offgoing nurse). Report included the following information SBAR, Kardex, Intake/Output, MAR and Recent Results.

## 2019-11-25 NOTE — PROGRESS NOTES
Problem: Mobility Impaired (Adult and Pediatric)  Goal: *Acute Goals and Plan of Care (Insert Text)  Description  Physical Therapy Goals  Initiated 11/25/2019 and to be accomplished within 3-7 day(s)  1. Patient will move from supine to sit and sit to supine  in bed with supervision/set-up. 2.  Patient will transfer from bed to chair and chair to bed with supervision/set-up using the least restrictive device. 3.  Patient will perform sit to stand with supervision/set-up. 4.  Patient will ambulate with supervision/set-up for 100 feet with the least restrictive device. 5.  Patient will ascend/descend 1 stairs without handrail(s) with minimal assistance/contact guard assist.   Note:   PHYSICAL THERAPY EVALUATION    Patient: Zoya Amos (42 y.o. female)  Date: 11/25/2019  Primary Diagnosis: Pneumonia [J18.9]  CHF (congestive heart failure) ([facility] [address] [I50.9]  Diarrhea [R19.7]  Precautions:   Fall    ASSESSMENT :  Based on the objective data described below, the patient presents with lower extremity weakness, decreased gait quality and endurance, impaired bed mobility and transfers, and overall limitations in functional mobility. Pt performed supine to sit with CGA, sit to stand with [place]. Patient ambulated 30 feet with RW, GB applied, CGA, O2 via NC; R foot drop. Pt usually wears R AFO and R knee brace, not present in hospital. Pt tolerated session fairly as evidenced by no c/o increased pain, no lightheadedness or dizziness. Patient would benefit from skilled inpatient physical therapy to address deficits, progress as tolerated to achieve long term goals and allow safe discharge. Patient will benefit from skilled intervention to address the above impairments.   Patients rehabilitation potential is considered to be Good  Factors which may influence rehabilitation potential include:   []         None noted  []         Mental ability/status  [x]         Medical condition  []         Home/family situation and support systems  []         Safety awareness  []         Pain tolerance/management  []         Other:      PLAN :  Recommendations and Planned Interventions:  [x]           Bed Mobility Training             [x]    Neuromuscular Re-Education  [x]           Transfer Training                   []    Orthotic/Prosthetic Training  [x]           Gait Training                          []    Modalities  [x]           Therapeutic Exercises          []    Edema Management/Control  [x]           Therapeutic Activities            [x]    Patient and Family Training/Education  []           Other (comment):    Frequency/Duration: Patient will be followed by physical therapy 1-2 times per day to address goals. Discharge Recommendations: Home Health  Further Equipment Recommendations for Discharge: N/A     SUBJECTIVE:   Patient stated I feel so tired.     OBJECTIVE DATA SUMMARY:     Past Medical History:   Diagnosis Date    Adult hypothyroidism     Arthritis     Cancer ([facility] [address]     Hypertension     Osteoporosis, idiopathic     Sciatica     Spinal stenosis     Squamous cell cancer of skin of forearm, right      Past Surgical History:   Procedure Laterality Date    HX APPENDECTOMY      HX CATARACT REMOVAL      HX CHOLECYSTECTOMY      HX DILATION AND CURETTAGE      HX KNEE ARTHROSCOPY      HX MOHS PROCEDURES      HX PARTIAL THYROIDECTOMY      HX REFRACTIVE SURGERY       Barriers to Learning/Limitations: None; occasional confusion  Compensate with: Visual Cues, Verbal Cues, and Tactile Cues  Prior Level of Function/Home Situation: Independent amb c/rollator or cane  Home Situation  Home Environment: Private residence  # Steps to Enter: 1  Rails to Enter: No  One/Two Story Residence: Two story, live on 1st floor  Living Alone: No  Support Systems: Family member(s)  Patient Expects to be Discharged to[de-identified] Private residence  Current DME Used/Available at Home: demetris Birdator, Cane, straight  Critical Behavior:  Neurologic State: Alert; Appropriate for age  Orientation Level: Oriented X4;Appropriate for age  Cognition: Appropriate decision making   Psychosocial  Purposeful Interaction: Yes  Pt Identified Daily Priority: Clinical issues (comment)  Caritas Process: Nurture loving kindness  Caring Interventions: Reassure  Reassure: Therapeutic listening  Therapeutic Modalities: Intentional therapeutic touch  Strength:    Strength: Generally decreased, functional  Tone & Sensation:   Tone: Normal  Sensation: Intact  Range Of Motion:  AROM: Generally decreased, functional  Functional Mobility:  Bed Mobility:  Supine to Sit: Contact guard assistance  Sit to Supine: Contact guard assistance  Transfers:  Sit to Stand: Minimum assistance  Stand to Sit: Minimum assistance  Ambulation/Gait Training:  Distance (ft): 30 Feet (ft)  Assistive Device: Gait belt;Walker, rolling  Ambulation - Level of Assistance: Contact guard assistance  Gait Description (WDL): Exceptions to WDL  Gait Abnormalities: Decreased step clearance; Step to gait; Foot drop(on R)  Base of Support: Narrowed  Stance: Weight shift  Speed/Mary: Slow  Step Length: Right shortened;Left shortened  Pain:  Pain Scale 1: Numeric (0 - 10)  Pain Intensity 1: 0  Activity Tolerance:   Good  Please refer to the flowsheet for vital signs taken during this treatment. After treatment:   []         Patient left in no apparent distress sitting up in chair  [x]         Patient left in no apparent distress in bed  [x]         Call bell left within reach  [x]         Nursing notified  []         Caregiver present  []         Bed alarm activated    COMMUNICATION/EDUCATION:   [x]         Fall prevention education was provided and the patient/caregiver indicated understanding. [x]         Patient/family have participated as able in goal setting and plan of care. [x]         Patient/family agree to work toward stated goals and plan of care.   []         Patient understands intent and goals of therapy, but is neutral about his/her participation. []         Patient is unable to participate in goal setting and plan of care.     Thank you for this referral.  Kenji Arellano   Time Calculation: 38 mins   Eval Complexity: History: MEDIUM  Complexity : 1-2 comorbidities / personal factors will impact the outcome/ POC Exam:MEDIUM Complexity : 3 Standardized tests and measures addressing body structure, function, activity limitation and / or participation in recreation  Presentation: MEDIUM Complexity : Evolving with changing characteristics  Clinical Decision Making:Medium Complexity    Overall Complexity:MEDIUM

## 2019-11-25 NOTE — ROUTINE PROCESS
TRANSFER - IN REPORT: 
 
Verbal report received from RADHAMES(name) on Margarita Vaca  being received from ED(unit) for routine progression of care Report consisted of patients Situation, Background, Assessment and  
Recommendations(SBAR). Information from the following report(s) SBAR, Kardex, Intake/Output, MAR, Accordion, Recent Results and Med Rec Status was reviewed with the receiving nurse. Opportunity for questions and clarification was provided. Assessment completed upon patients arrival to unit and care assumed.

## 2019-11-25 NOTE — PROGRESS NOTES
1855: Patient care received. Patient alert and oriented x 4. Patient resting in bed. Call light with in reach bed in lowest position.

## 2019-11-25 NOTE — PROGRESS NOTES
Problem: Risk for Spread of Infection  Goal: Prevent transmission of infectious organism to others  Description  Prevent the transmission of infectious organisms to other patients, staff members, and visitors. Outcome: Progressing Towards Goal     Problem: Patient Education:  Go to Education Activity  Goal: Patient/Family Education  Outcome: Progressing Towards Goal     Problem: Pain  Goal: *Control of Pain  Outcome: Progressing Towards Goal  Goal: *PALLIATIVE CARE:  Alleviation of Pain  Outcome: Progressing Towards Goal     Problem: Patient Education: Go to Patient Education Activity  Goal: Patient/Family Education  Outcome: Progressing Towards Goal     Problem: Pressure Injury - Risk of  Goal: *Prevention of pressure injury  Description  Document Dilip Scale and appropriate interventions in the flowsheet. Outcome: Progressing Towards Goal  Note: Pressure Injury Interventions:       Moisture Interventions: Absorbent underpads, Check for incontinence Q2 hours and as needed, Internal/External urinary devices, Minimize layers, Moisture barrier    Activity Interventions: Increase time out of bed, Pressure redistribution bed/mattress(bed type), PT/OT evaluation    Mobility Interventions: HOB 30 degrees or less, Pressure redistribution bed/mattress (bed type), PT/OT evaluation    Nutrition Interventions: Document food/fluid/supplement intake, Offer support with meals,snacks and hydration                     Problem: Patient Education: Go to Patient Education Activity  Goal: Patient/Family Education  Outcome: Progressing Towards Goal     Problem: Falls - Risk of  Goal: *Absence of Falls  Description  Document Yanci Fall Risk and appropriate interventions in the flowsheet.   Outcome: Progressing Towards Goal  Note: Fall Risk Interventions:  Mobility Interventions: Bed/chair exit alarm, Communicate number of staff needed for ambulation/transfer, Patient to call before getting OOB, PT Consult for mobility concerns, PT Consult for assist device competence, Utilize walker, cane, or other assistive device         Medication Interventions: Bed/chair exit alarm, Patient to call before getting OOB, Teach patient to arise slowly    Elimination Interventions: Call light in reach, Bed/chair exit alarm, Stay With Me (per policy), Patient to call for help with toileting needs, Toileting schedule/hourly rounds, Toilet paper/wipes in reach              Problem: Patient Education: Go to Patient Education Activity  Goal: Patient/Family Education  Outcome: Progressing Towards Goal

## 2019-11-25 NOTE — ROUTINE PROCESS
Bedside and Verbal shift change report given to JUDY Allison R.N. (oncoming nurse) by AP Tirado R.N. (offgoing nurse). Report included the following information SBAR, Kardex, Intake/Output, MAR, Accordion, Recent Results and Med Rec Status.

## 2019-11-25 NOTE — PROGRESS NOTES
9148  Assumed care of patient at this time, assessment complete. Patient alert and oriented x4. Denies SOB and chest pain. Patient lungs wheezing bilaterally. Cap refilled  less than 3 seconds. Patient denies numbness and tingling to all extremities. Stated pain 0/10. Patient has 22G IV to left forearm. Ice pack in place, call light and personal items in reach, bed in low position and locked, will continue to monitor patient. Fall risk arm band in place. Family member at bedside. [id number]  Paged Dr. Bhavani Esquivel, to request order for chest xray to be done prior to Nuclear med lung test. Awaiting call back. 1500  Rapid flu test done on patient and sent to lab.     1822  Patient had uneventful shift. No signs or symptoms of distress. On coming nurse made aware that pt is on enteric contact isolation for C-diff, also made nurse aware that stool sample still needs to be collected. Patient has had smears today but not enough to get a sample. Bed alarm is on as well as patient will try to get up out of bed. Patient is able to stand with assist and walker at bedside. Plan for patient to d/c home.

## 2019-11-25 NOTE — PROGRESS NOTES
Hospitalist Progress Note-critical care note     Patient: Leni Hernandez MRN: [id number]  CSN: [id number]    YOB: 1931  Age: 80 y.o. Sex: female    DOA: 11/24/2019 LOS:  LOS: 1 day            Chief complaint: Diarrhea, pneumonia, hypertension, UTI, hypokalemia, hypomagnesemia    Assessment/Plan         Hospital Problems  Never Reviewed          Codes Class Noted POA    Hypomagnesemia ICD-10-CM: E83.42  ICD-9-CM: 275.2  11/25/2019 Unknown        Hypokalemia ICD-10-CM: E87.6  ICD-9-CM: 276.8  11/25/2019 Unknown        Diarrhea ICD-10-CM: R19.7  ICD-9-CM: 787.91  11/24/2019 Unknown        * (Principal) Pneumonia ICD-10-CM: J18.9  ICD-9-CM: 486  11/24/2019 Unknown        Hypertension ICD-10-CM: I10  ICD-9-CM: 401.9  11/24/2019 Unknown        Adult hypothyroidism ICD-10-CM: E03.9  ICD-9-CM: 244.9  11/24/2019 Unknown        Elevated brain natriuretic peptide (BNP) level ICD-10-CM: R79.89  ICD-9-CM: 790.99  11/24/2019 Unknown        Elevated troponin ICD-10-CM: R79.89  ICD-9-CM: 790.6  11/24/2019 Unknown        UTI (urinary tract infection) ICD-10-CM: N39.0  ICD-9-CM: 599.0  11/24/2019 Unknown        Stage 3 chronic kidney disease ([facility] [address] ICD-10-CM: N18.3  ICD-9-CM: 585.3  11/24/2019 Unknown              Pneumonia,  Continue  azithromycin and Zosyn, breathing treatment as needed,   CT chest, pna   V/q scan still pending .  Flu screen ordered, not done      Elevated BNP,  No pleural effusion or pulmonary edema indicated from checks x-ray and ct chest   Echo done,will f/u with the results      Elevated troponin, no chest pain, trop flat      Urinary tract infection, on Zosyn,     Diarrhea, will send C. difficile, stool started, CT abdomen-no colitis   Stool study pending, two times bm since admission          HTN, accelerated  We will hold home medication, due to CKD, give Norvasc for blood pressure control.     CKD 3  Follow-up with nephrologist as outpatient, avoid NSAIDs and IV contrast,   Cr better Hyponatremia   Improving.      Hypothyroidism continue Synthroid    Hypokalemia   K replacement     Hypomagnesemia   Mg replacement       Subjective: sob better, bm twice     Daughter was at the bedside. All questions have been answered. 35 total min's spent on patient care including >50% on counseling/coordinating care. Discussed the above assessments. also discussed labs, medications and hospital course    Disposition :tbd,   Review of systems:    General: No fevers or chills. Cardiovascular: No chest pain or pressure. No palpitations. Pulmonary: No shortness of breath. Gastrointestinal: No nausea, vomiting. Vital signs/Intake and Output:  Visit Vitals  /71 (BP 1 Location: Right arm, BP Patient Position: At rest;Supine)   Pulse 83   Temp 97.9 °F (36.6 °C)   Resp 16   Ht 5' 3\" (1.6 m)   Wt 82.6 kg (182 lb)   SpO2 94%   BMI 32.24 kg/m²     Current Shift:  No intake/output data recorded. Last three shifts:  11/23 1901 - 11/25 0700  In: 1127.5 [P.O.:420; I.V.:707.5]  Out: -     Physical Exam:  General: WD, WN. Alert, cooperative, no acute distress    HEENT: NC, Atraumatic. PERRLA, anicteric sclerae. Lungs: CTA Bilaterally. No Wheezing/Rhonchi/Rales. Heart:  Regular  rhythm,  No murmur, No Rubs, No Gallops  Abdomen: Soft, Non distended, Non tender.  +Bowel sounds,   Extremities: No c/c/e  Psych:   Not anxious or agitated. Neurologic:  No acute neurological deficit.              Labs: Results:       Chemistry Recent Labs     11/25/19  0145 11/24/19  1630   * 145*   * 131*   K 3.0* 3.5   CL 97* 97*   CO2 23 21   BUN 26* 24*   CREA 1.60* 1.86*   CA 9.1 9.2   AGAP 12 13   BUCR 16 13   AP  --  101   TP  --  7.1   ALB  --  2.8*   GLOB  --  4.3*   AGRAT  --  0.7*      CBC w/Diff Recent Labs     11/25/19  0145 11/24/19  1630   WBC 17.0* 18.4*   RBC 3.72* 4.09*   HGB 11.4* 12.6   HCT 34.0* 37.6    362   GRANS 81* 88*   LYMPH 8* 4*   EOS 0 0      Cardiac Enzymes Recent Labs 11/25/19  0730 11/25/19  0145    181   CKND1 2.8 3.2      Coagulation No results for input(s): PTP, INR, APTT, INREXT, INREXT in the last 72 hours. Lipid Panel No results found for: CHOL, CHOLPOCT, CHOLX, CHLST, CHOLV, 097901, HDL, HDLP, LDL, LDLC, DLDLP, 537056, VLDLC, VLDL, TGLX, TRIGL, TRIGP, TGLPOCT, CHHD, CHHDX   BNP No results for input(s): BNPP in the last 72 hours.    Liver Enzymes Recent Labs     11/24/19  1630   TP 7.1   ALB 2.8*      SGOT 30      Thyroid Studies No results found for: T4, T3U, TSH, TSHEXT, TSHEXT     Procedures/imaging: see electronic medical records for all procedures/Xrays and details which were not copied into this note but were reviewed prior to creation of Gage Hughes MD

## 2019-11-25 NOTE — PROGRESS NOTES
Problem: Risk for Spread of Infection  Goal: Prevent transmission of infectious organism to others  Description  Prevent the transmission of infectious organisms to other patients, staff members, and visitors. Outcome: Progressing Towards Goal     Problem: Patient Education:  Go to Education Activity  Goal: Patient/Family Education  Outcome: Progressing Towards Goal     Problem: Pain  Goal: *Control of Pain  Outcome: Progressing Towards Goal  Goal: *PALLIATIVE CARE:  Alleviation of Pain  Outcome: Progressing Towards Goal     Problem: Patient Education: Go to Patient Education Activity  Goal: Patient/Family Education  Outcome: Progressing Towards Goal     Problem: Pressure Injury - Risk of  Goal: *Prevention of pressure injury  Description  Document Dilip Scale and appropriate interventions in the flowsheet. Outcome: Progressing Towards Goal  Note: Pressure Injury Interventions:       Moisture Interventions: Absorbent underpads    Activity Interventions: Increase time out of bed    Mobility Interventions: HOB 30 degrees or less    Nutrition Interventions: Document food/fluid/supplement intake                     Problem: Patient Education: Go to Patient Education Activity  Goal: Patient/Family Education  Outcome: Progressing Towards Goal     Problem: Falls - Risk of  Goal: *Absence of Falls  Description  Document Mason Anderson Fall Risk and appropriate interventions in the flowsheet.   Outcome: Progressing Towards Goal  Note: Fall Risk Interventions:  Mobility Interventions: Bed/chair exit alarm         Medication Interventions: Bed/chair exit alarm    Elimination Interventions: Call light in reach              Problem: Patient Education: Go to Patient Education Activity  Goal: Patient/Family Education  Outcome: Progressing Towards Goal

## 2019-11-26 PROBLEM — I50.43 ACUTE ON CHRONIC COMBINED SYSTOLIC AND DIASTOLIC CHF (CONGESTIVE HEART FAILURE) (HCC): Status: ACTIVE | Noted: 2019-11-26

## 2019-11-26 LAB
ANION GAP SERPL CALC-SCNC: 8 MMOL/L (ref 3–18)
BASOPHILS # BLD: 0 K/UL (ref 0–0.1)
BASOPHILS NFR BLD: 0 % (ref 0–2)
BUN SERPL-MCNC: 25 MG/DL (ref 7–18)
BUN/CREAT SERPL: 19 (ref 12–20)
CALCIUM SERPL-MCNC: 9.5 MG/DL (ref 8.5–10.1)
CHLORIDE SERPL-SCNC: 103 MMOL/L (ref 100–111)
CO2 SERPL-SCNC: 24 MMOL/L (ref 21–32)
CREAT SERPL-MCNC: 1.35 MG/DL (ref 0.6–1.3)
DIFFERENTIAL METHOD BLD: ABNORMAL
EOSINOPHIL # BLD: 0.3 K/UL (ref 0–0.4)
EOSINOPHIL NFR BLD: 3 % (ref 0–5)
ERYTHROCYTE [DISTWIDTH] IN BLOOD BY AUTOMATED COUNT: 13.5 % (ref 11.6–14.5)
GLUCOSE SERPL-MCNC: 93 MG/DL (ref 74–99)
HCT VFR BLD AUTO: 33.3 % (ref 35–45)
HGB BLD-MCNC: 10.8 G/DL (ref 12–16)
LYMPHOCYTES # BLD: 1.5 K/UL (ref 0.9–3.6)
LYMPHOCYTES NFR BLD: 11 % (ref 21–52)
MAGNESIUM SERPL-MCNC: 2.6 MG/DL (ref 1.6–2.6)
MCH RBC QN AUTO: 30 PG (ref 24–34)
MCHC RBC AUTO-ENTMCNC: 32.4 G/DL (ref 31–37)
MCV RBC AUTO: 92.5 FL (ref 74–97)
MONOCYTES # BLD: 1.2 K/UL (ref 0.05–1.2)
MONOCYTES NFR BLD: 9 % (ref 3–10)
NEUTS SEG # BLD: 10 K/UL (ref 1.8–8)
NEUTS SEG NFR BLD: 77 % (ref 40–73)
PLATELET # BLD AUTO: 337 K/UL (ref 135–420)
PMV BLD AUTO: 9.9 FL (ref 9.2–11.8)
POTASSIUM SERPL-SCNC: 3.7 MMOL/L (ref 3.5–5.5)
RBC # BLD AUTO: 3.6 M/UL (ref 4.2–5.3)
SODIUM SERPL-SCNC: 135 MMOL/L (ref 136–145)
WBC # BLD AUTO: 13 K/UL (ref 4.6–13.2)

## 2019-11-26 PROCEDURE — 74011000250 HC RX REV CODE- 250: Performed by: HOSPITALIST

## 2019-11-26 PROCEDURE — 94760 N-INVAS EAR/PLS OXIMETRY 1: CPT

## 2019-11-26 PROCEDURE — 85025 COMPLETE CBC W/AUTO DIFF WBC: CPT

## 2019-11-26 PROCEDURE — 77010033678 HC OXYGEN DAILY

## 2019-11-26 PROCEDURE — 83735 ASSAY OF MAGNESIUM: CPT

## 2019-11-26 PROCEDURE — 74011250636 HC RX REV CODE- 250/636: Performed by: HOSPITALIST

## 2019-11-26 PROCEDURE — 97530 THERAPEUTIC ACTIVITIES: CPT

## 2019-11-26 PROCEDURE — 65660000000 HC RM CCU STEPDOWN

## 2019-11-26 PROCEDURE — 97116 GAIT TRAINING THERAPY: CPT

## 2019-11-26 PROCEDURE — 36415 COLL VENOUS BLD VENIPUNCTURE: CPT

## 2019-11-26 PROCEDURE — 80048 BASIC METABOLIC PNL TOTAL CA: CPT

## 2019-11-26 PROCEDURE — 74011000258 HC RX REV CODE- 258: Performed by: HOSPITALIST

## 2019-11-26 PROCEDURE — 74011250637 HC RX REV CODE- 250/637: Performed by: HOSPITALIST

## 2019-11-26 PROCEDURE — 94640 AIRWAY INHALATION TREATMENT: CPT

## 2019-11-26 PROCEDURE — 97166 OT EVAL MOD COMPLEX 45 MIN: CPT

## 2019-11-26 RX ORDER — HYDRALAZINE HYDROCHLORIDE 20 MG/ML
10 INJECTION INTRAMUSCULAR; INTRAVENOUS
Status: DISCONTINUED | OUTPATIENT
Start: 2019-11-26 | End: 2019-11-27 | Stop reason: HOSPADM

## 2019-11-26 RX ORDER — NYSTATIN 100000 [USP'U]/G
POWDER TOPICAL 2 TIMES DAILY
Status: DISCONTINUED | OUTPATIENT
Start: 2019-11-26 | End: 2019-11-27 | Stop reason: HOSPADM

## 2019-11-26 RX ORDER — CARVEDILOL 12.5 MG/1
12.5 TABLET ORAL 2 TIMES DAILY WITH MEALS
Status: DISCONTINUED | OUTPATIENT
Start: 2019-11-26 | End: 2019-11-27 | Stop reason: HOSPADM

## 2019-11-26 RX ORDER — FUROSEMIDE 10 MG/ML
20 INJECTION INTRAMUSCULAR; INTRAVENOUS DAILY
Status: DISCONTINUED | OUTPATIENT
Start: 2019-11-27 | End: 2019-11-27 | Stop reason: HOSPADM

## 2019-11-26 RX ADMIN — PIPERACILLIN AND TAZOBACTAM 3.38 G: 3; .375 INJECTION, POWDER, LYOPHILIZED, FOR SOLUTION INTRAVENOUS at 06:25

## 2019-11-26 RX ADMIN — IPRATROPIUM BROMIDE AND ALBUTEROL SULFATE 3 ML: .5; 3 SOLUTION RESPIRATORY (INHALATION) at 12:17

## 2019-11-26 RX ADMIN — Medication 1 TABLET: at 08:47

## 2019-11-26 RX ADMIN — HEPARIN SODIUM 5000 UNITS: 5000 INJECTION INTRAVENOUS; SUBCUTANEOUS at 18:40

## 2019-11-26 RX ADMIN — GABAPENTIN 200 MG: 100 CAPSULE ORAL at 08:47

## 2019-11-26 RX ADMIN — GABAPENTIN 200 MG: 100 CAPSULE ORAL at 23:09

## 2019-11-26 RX ADMIN — HEPARIN SODIUM 5000 UNITS: 5000 INJECTION INTRAVENOUS; SUBCUTANEOUS at 11:02

## 2019-11-26 RX ADMIN — Medication 1 TABLET: at 23:09

## 2019-11-26 RX ADMIN — HEPARIN SODIUM 5000 UNITS: 5000 INJECTION INTRAVENOUS; SUBCUTANEOUS at 03:40

## 2019-11-26 RX ADMIN — AMLODIPINE BESYLATE 10 MG: 5 TABLET ORAL at 08:47

## 2019-11-26 RX ADMIN — GABAPENTIN 200 MG: 100 CAPSULE ORAL at 15:45

## 2019-11-26 RX ADMIN — CARVEDILOL 12.5 MG: 12.5 TABLET, FILM COATED ORAL at 17:34

## 2019-11-26 RX ADMIN — NYSTATIN: 100000 POWDER TOPICAL at 23:19

## 2019-11-26 RX ADMIN — PIPERACILLIN AND TAZOBACTAM 3.38 G: 3; .375 INJECTION, POWDER, LYOPHILIZED, FOR SOLUTION INTRAVENOUS at 23:08

## 2019-11-26 RX ADMIN — PIPERACILLIN AND TAZOBACTAM 3.38 G: 3; .375 INJECTION, POWDER, LYOPHILIZED, FOR SOLUTION INTRAVENOUS at 15:46

## 2019-11-26 RX ADMIN — LEVOTHYROXINE SODIUM 100 MCG: 100 TABLET ORAL at 06:30

## 2019-11-26 RX ADMIN — FAMOTIDINE 40 MG: 20 TABLET ORAL at 23:09

## 2019-11-26 RX ADMIN — AZITHROMYCIN MONOHYDRATE 500 MG: 500 INJECTION, POWDER, LYOPHILIZED, FOR SOLUTION INTRAVENOUS at 18:40

## 2019-11-26 RX ADMIN — FAMOTIDINE 40 MG: 20 TABLET ORAL at 08:47

## 2019-11-26 NOTE — ROUTINE PROCESS
1914 
Bedside in verbal shift change report given to Mary Davalos 1729 (oncoming nurse) by Margaret Roldan RN (off going nurse). Report included the following information: SBAR, KARDEX, MAR and recent results.

## 2019-11-26 NOTE — PROGRESS NOTES
Transition of care: home with [facility] when medically cleared  Met with patient and her daughter at bedside. She has been provided with foc for [facility]. She has chosen [facility]. Cm will place referral. Patient lives with her daughter  Patient currently wearing oxygen; however, she does not have home oxygen. Please wean as tolerated or place a walk test 24 hours before d/c. patient has Dr Renee Contreras for pcp and she has medicare for insurance   She denies other needs. Cm will continue to follow  Care Management Interventions  PCP Verified by CM:  Yes  Transition of Care Consult (CM Consult): [address]: Yes  Physical Therapy Consult: Yes  Occupational Therapy Consult: Yes  Current Support Network: Relative's Home  Confirm Follow Up Transport: Family  Plan discussed with Pt/Family/Caregiver: Yes  Freedom of Choice Offered: Yes  Discharge Location  Discharge Placement: Home with home health

## 2019-11-26 NOTE — HOME CARE
Met with  patient in room. Verified address and telephone numbers. Explained home care services and rountines. Will continue to follow for home care orders at discharge.      Keli Dhillon RN, BSN   [employer]

## 2019-11-26 NOTE — PROGRESS NOTES
Hospitalist Progress Note-critical care note     Patient: Lubna Blas MRN: [id number]  CSN: [id number]    YOB: 1931  Age: 80 y.o.   Sex: female    DOA: 11/24/2019 LOS:  LOS: 2 days            Chief complaint: Diarrhea, pneumonia, hypertension, UTI, hypokalemia, hypomagnesemia    Assessment/Plan         Hospital Problems  Never Reviewed          Codes Class Noted POA    Acute on chronic combined systolic and diastolic CHF (congestive heart failure) ([facility] ICD-10-CM: I50.43  ICD-9-CM: 428.43, 428.0  11/26/2019 Unknown        Hypomagnesemia ICD-10-CM: E83.42  ICD-9-CM: 275.2  11/25/2019 Unknown        Hypokalemia ICD-10-CM: E87.6  ICD-9-CM: 276.8  11/25/2019 Unknown        Diarrhea ICD-10-CM: R19.7  ICD-9-CM: 787.91  11/24/2019 Unknown        * (Principal) Pneumonia ICD-10-CM: J18.9  ICD-9-CM: 486  11/24/2019 Unknown        Hypertension ICD-10-CM: I10  ICD-9-CM: 401.9  11/24/2019 Unknown        Adult hypothyroidism ICD-10-CM: E03.9  ICD-9-CM: 244.9  11/24/2019 Unknown        Elevated brain natriuretic peptide (BNP) level ICD-10-CM: R79.89  ICD-9-CM: 790.99  11/24/2019 Unknown        Elevated troponin ICD-10-CM: R79.89  ICD-9-CM: 790.6  11/24/2019 Unknown        UTI (urinary tract infection) ICD-10-CM: N39.0  ICD-9-CM: 599.0  11/24/2019 Unknown        Stage 3 chronic kidney disease ([facility] ICD-10-CM: N18.3  ICD-9-CM: 585.3  11/24/2019 Unknown              Pneumonia,  Continue  azithromycin and Zosyn, breathing treatment as needed,   CT chest, pna   V/q scan no pe,      chf exacerbation -combined , acute on chronic -poa   will have cardiologist on board   Lasix   ef 19% grade 1 diastolic dysfunction      Elevated troponin, no chest pain, trop flat      Urinary tract infection, on Zosyn,     Diarrhea, will send C. difficile, stool started, CT abdomen-no colitis   Stool study cx negative , imodium          HTN, accelerated  Will give low dose lasix low dose coreg      CKD 3  Follow-up with nephrologist as outpatient, avoid NSAIDs and IV contrast,   Cr better     Hyponatremia   Improving.      Hypothyroidism continue Synthroid    Hypokalemia   Resolved     Hypomagnesemia   Resolved       Subjective: sob better, still having diarrhea     Daughter was at the bedside. Will give lasix, try to wean off nc O2, imodium for diarrhea    Disposition :1-2 days   Review of systems:    General: No fevers or chills. Cardiovascular: No chest pain or pressure. No palpitations. Pulmonary: No shortness of breath. Gastrointestinal: No nausea, vomiting. Diarrhea     Vital signs/Intake and Output:  Visit Vitals  /72 (BP 1 Location: Right arm, BP Patient Position: At rest)   Pulse 78   Temp 97.8 °F (36.6 °C)   Resp 24   Ht 5' 3\" (1.6 m)   Wt 52.6 kg (115 lb 15.4 oz)   SpO2 95%   BMI 20.54 kg/m²     Current Shift:  No intake/output data recorded. Last three shifts:  11/24 1901 - 11/26 0700  In: 1477.5 [P.O.:420; I.V.:1057.5]  Out: 100 [Urine:100]    Physical Exam:  General: WD, WN. Alert, cooperative, no acute distress    HEENT: NC, Atraumatic. PERRLA, anicteric sclerae. Lungs: CTA Bilaterally. No Wheezing/Rhonchi/Rales. Heart:  Regular  rhythm,  No murmur, No Rubs, No Gallops  Abdomen: Soft, Non distended, Non tender.  +Bowel sounds,   Extremities: No c/c/e  Psych:   Not anxious or agitated. Neurologic:  No acute neurological deficit.              Labs: Results:       Chemistry Recent Labs     11/26/19 0411 11/25/19  0145 11/24/19  1630   GLU 93 110* 145*   * 132* 131*   K 3.7 3.0* 3.5    97* 97*   CO2 24 23 21   BUN 25* 26* 24*   CREA 1.35* 1.60* 1.86*   CA 9.5 9.1 9.2   AGAP 8 12 13   BUCR 19 16 13   AP  --   --  101   TP  --   --  7.1   ALB  --   --  2.8*   GLOB  --   --  4.3*   AGRAT  --   --  0.7*      CBC w/Diff Recent Labs     11/26/19 0411 11/25/19  0145 11/24/19  1630   WBC 13.0 17.0* 18.4*   RBC 3.60* 3.72* 4.09*   HGB 10.8* 11.4* 12.6   HCT 33.3* 34.0* 37.6    286 362   GRANS 77* 81* 88*   LYMPH 11* 8* 4*   EOS 3 0 0      Cardiac Enzymes Recent Labs     11/25/19  0730 11/25/19  0145    181   CKND1 2.8 3.2      Coagulation No results for input(s): PTP, INR, APTT, INREXT, INREXT in the last 72 hours. Lipid Panel No results found for: CHOL, CHOLPOCT, CHOLX, CHLST, CHOLV, 165211, HDL, HDLP, LDL, LDLC, DLDLP, 545774, VLDLC, VLDL, TGLX, TRIGL, TRIGP, TGLPOCT, CHHD, CHHDX   BNP No results for input(s): BNPP in the last 72 hours.    Liver Enzymes Recent Labs     11/24/19  1630   TP 7.1   ALB 2.8*      SGOT 30      Thyroid Studies No results found for: T4, T3U, TSH, TSHEXT, TSHEXT     Procedures/imaging: see electronic medical records for all procedures/Xrays and details which were not copied into this note but were reviewed prior to creation of Linn Batista MD

## 2019-11-26 NOTE — PROGRESS NOTES
Problem: Mobility Impaired (Adult and Pediatric)  Goal: *Acute Goals and Plan of Care (Insert Text)  Description  Physical Therapy Goals  Initiated 11/25/2019 and to be accomplished within 3-7 day(s)  1. Patient will move from supine to sit and sit to supine  in bed with supervision/set-up. 2.  Patient will transfer from bed to chair and chair to bed with supervision/set-up using the least restrictive device. 3.  Patient will perform sit to stand with supervision/set-up. 4.  Patient will ambulate with supervision/set-up for 100 feet with the least restrictive device. 5.  Patient will ascend/descend 1 stairs without handrail(s) with minimal assistance/contact guard assist.   Outcome: Progressing Towards Goal   PHYSICAL THERAPY TREATMENT    Patient: Holley Hardy (44 y.o. female)  Date: 11/26/2019  Diagnosis: Pneumonia [J18.9]  CHF (congestive heart failure) (formerly [facility]) [I50.9]  Diarrhea [R19.7]   Pneumonia    Precautions: Fall   Chart, physical therapy assessment, plan of care and goals were reviewed. ASSESSMENT:  Patient agreeable to participate. Moderate fatigue noted on exertion. Pt able to maintain 35' ambulation distance with RW. CGA needed as standing balance slightly unsteady. O2 decreased to 85% on RA while ambulating, however increased to 93% with deep breathing, while still ambulating. Reapplied O2 at 1L once pt returned to bed, in chair position. Cont POC. Progression toward goals:  []      Improving appropriately and progressing toward goals  [x]      Improving slowly and progressing toward goals  []      Not making progress toward goals and plan of care will be adjusted     PLAN:  Patient continues to benefit from skilled intervention to address the above impairments. Continue treatment per established plan of care.   Discharge Recommendations:  Home Health w assistance   Further Equipment Recommendations for Discharge:  rolling walker     SUBJECTIVE:   Patient stated  I have a shoe that I wear      OBJECTIVE DATA SUMMARY:   Critical Behavior:  Neurologic State: Alert  Orientation Level: Oriented X4  Cognition: Follows commands  Safety/Judgement: Awareness of environment, Fall prevention  Functional Mobility Training:  Bed Mobility:  Supine to Sit: Stand-by assistance  Sit to Supine: Stand-by assistance    Transfers:  Sit to Stand: Contact guard assistance  Stand to Sit: Contact guard assistance    Balance:  Sitting: Intact  Standing: Impaired; With support  Standing - Static: Fair  Standing - Dynamic : Fair  Ambulation/Gait Training:  Distance (ft): 35 Feet (ft)  Assistive Device: Walker, rolling;Gait belt  Ambulation - Level of Assistance: Contact guard assistance  Gait Abnormalities: Decreased step clearance; Foot drop; Step to gait(on R)  Base of Support: Narrowed  Stance: Weight shift  Speed/Mary: Slow  Step Length: Right shortened;Left shortened    Activity Tolerance:   Fair     After treatment:   [] Patient left in no apparent distress sitting up in chair  [x] Patient left in no apparent distress in bed(chair position)   [x] Call bell left within reach  [x] Nursing notified  [] Caregiver present  [] Bed alarm activated      Orestes Díaz PTA   Time Calculation: 27 mins

## 2019-11-26 NOTE — CONSULTS
Cardiolology  Inpatient Consult      Patient: Russ Carpenter               Sex: female          DOA: 11/24/2019       YOB: 1931      Age:  80 y.o.        LOS:  LOS: 2 days      Russ Carpenter is a 80 y.o. female admitted for Pneumonia [J18.9]  CHF (congestive heart failure) ([facility] [I50.9]  Diarrhea [R19.7]     Recommendations:  · Echo  · Serial markers  · Monitor  · Follow    Impression:  · CHF  · Diarrhea  · Elevated troponin, non-ACS  · Other problems as enumerated below    Patient Active Problem List    Diagnosis Date Noted    Acute on chronic combined systolic and diastolic CHF (congestive heart failure) ([facility] 11/26/2019    Hypomagnesemia 11/25/2019    Hypokalemia 11/25/2019    Diarrhea 11/24/2019    Pneumonia 11/24/2019    Hypertension 11/24/2019    Adult hypothyroidism 11/24/2019    Elevated brain natriuretic peptide (BNP) level 11/24/2019    Elevated troponin 11/24/2019    UTI (urinary tract infection) 11/24/2019    Stage 3 chronic kidney disease ([facility] 11/24/2019      Deisi Rivera MD  Past Medical History:   Diagnosis Date    Adult hypothyroidism     Arthritis     Cancer ([facility]     Hypertension     Osteoporosis, idiopathic     Sciatica     Spinal stenosis     Squamous cell cancer of skin of forearm, right       Past Surgical History:   Procedure Laterality Date    HX APPENDECTOMY      HX CATARACT REMOVAL      HX CHOLECYSTECTOMY      HX DILATION AND CURETTAGE      HX KNEE ARTHROSCOPY      HX MOHS PROCEDURES      HX PARTIAL THYROIDECTOMY      HX REFRACTIVE SURGERY       Allergies   Allergen Reactions    Septra [Sulfamethoprim] Rash      History reviewed. No pertinent family history.    Current Facility-Administered Medications   Medication Dose Route Frequency    [START ON 11/27/2019] furosemide (LASIX) injection 20 mg  20 mg IntraVENous DAILY    carvedilol (COREG) tablet 12.5 mg  12.5 mg Oral BID WITH MEALS    hydrALAZINE (APRESOLINE) 20 mg/mL injection 10 mg  10 mg IntraVENous Q6H PRN    nystatin (MYCOSTATIN) 100,000 unit/gram powder   Topical BID    perflutren lipid microspheres (DEFINITY) in NS bolus IV  1 mL IntraVENous PRN    acetaminophen (TYLENOL) tablet 650 mg  650 mg Oral Q4H PRN    naloxone (NARCAN) injection 0.4 mg  0.4 mg IntraVENous PRN    diphenhydrAMINE (BENADRYL) injection 12.5 mg  12.5 mg IntraVENous Q4H PRN    ondansetron (ZOFRAN) injection 4 mg  4 mg IntraVENous Q4H PRN    heparin (porcine) injection 5,000 Units  5,000 Units SubCUTAneous Q8H    gabapentin (NEURONTIN) capsule 200 mg  200 mg Oral TID    levothyroxine (SYNTHROID) tablet 100 mcg  100 mcg Oral ACB    calcium-vitamin D (OS-ANAMIKA) 500 mg-200 unit tablet  1 Tab Oral BID    famotidine (PEPCID) tablet 40 mg  40 mg Oral BID    amLODIPine (NORVASC) tablet 10 mg  10 mg Oral DAILY    piperacillin-tazobactam (ZOSYN) 3.375 g in 0.9% sodium chloride (MBP/ADV) 100 mL MBP  3.375 g IntraVENous Q8H    azithromycin (ZITHROMAX) 500 mg in 0.9% sodium chloride 250 mL (VIAL-MATE)  500 mg IntraVENous Q24H    albuterol-ipratropium (DUO-NEB) 2.5 MG-0.5 MG/3 ML  3 mL Nebulization Q4H PRN         Review of Symptoms:      Subjective:     . Cardiac risk factors: extant. Physical Exam    Visit Vitals  /67 (BP 1 Location: Right arm, BP Patient Position: At rest)   Pulse 78   Temp 98 °F (36.7 °C)   Resp 22   Ht 5' 3\" (1.6 m)   Wt 52.6 kg (115 lb 15.4 oz)   SpO2 93%   BMI 20.54 kg/m²       General Appearance:  Well developed, well nourished,alert and oriented x 3, and individual in no acute distress. Ears/Nose/Mouth/Throat:   Hearing grossly normal.         Neck: Supple. Chest:   Lungs clear to auscultation bilaterally. Cardiovascular:  Regular rate and rhythm, S1, S2 normal, no murmur. Abdomen:   Soft, non-tender, bowel sounds are active. Extremities: No edema bilaterally. Skin: Warm and dry.      Cardiographics    Telemetry: normal sinus rhythm  ECG: ILBBB  Echocardiogram: Pending    Recent radiology, intake/output and wt reviewed    Labs:   Recent Results (from the past 48 hour(s))   CARDIAC PANEL,(CK, CKMB & TROPONIN)    Collection Time: 11/24/19  7:40 PM   Result Value Ref Range     26 - 192 U/L    CK - MB 2.9 <3.6 ng/ml    CK-MB Index 2.1 0.0 - 4.0 %    Troponin-I, QT 0.04 0.0 - 0.045 NG/ML   CULTURE, STOOL    Collection Time: 11/24/19 11:05 PM   Result Value Ref Range    Special Requests: NO SPECIAL REQUESTS      Culture result:        NO AEROMONAS,SALMONELLA,SHIGELLA,E. COLI 0:157 OR CAMPYLOBACTER ISOLATED TO DATE   METABOLIC PANEL, BASIC    Collection Time: 11/25/19  1:45 AM   Result Value Ref Range    Sodium 132 (L) 136 - 145 mmol/L    Potassium 3.0 (L) 3.5 - 5.5 mmol/L    Chloride 97 (L) 100 - 111 mmol/L    CO2 23 21 - 32 mmol/L    Anion gap 12 3.0 - 18 mmol/L    Glucose 110 (H) 74 - 99 mg/dL    BUN 26 (H) 7.0 - 18 MG/DL    Creatinine 1.60 (H) 0.6 - 1.3 MG/DL    BUN/Creatinine ratio 16 12 - 20      GFR est AA 37 (L) >60 ml/min/1.73m2    GFR est non-AA 30 (L) >60 ml/min/1.73m2    Calcium 9.1 8.5 - 10.1 MG/DL   MAGNESIUM    Collection Time: 11/25/19  1:45 AM   Result Value Ref Range    Magnesium 1.5 (L) 1.6 - 2.6 mg/dL   CBC WITH AUTOMATED DIFF    Collection Time: 11/25/19  1:45 AM   Result Value Ref Range    WBC 17.0 (H) 4.6 - 13.2 K/uL    RBC 3.72 (L) 4.20 - 5.30 M/uL    HGB 11.4 (L) 12.0 - 16.0 g/dL    HCT 34.0 (L) 35.0 - 45.0 %    MCV 91.4 74.0 - 97.0 FL    MCH 30.6 24.0 - 34.0 PG    MCHC 33.5 31.0 - 37.0 g/dL    RDW 13.1 11.6 - 14.5 %    PLATELET 017 774 - 168 K/uL    MPV 9.9 9.2 - 11.8 FL    NEUTROPHILS 81 (H) 42 - 75 %    BAND NEUTROPHILS 3 0 - 5 %    LYMPHOCYTES 8 (L) 20 - 51 %    MONOCYTES 8 2 - 9 %    EOSINOPHILS 0 0 - 5 %    BASOPHILS 0 0 - 3 %    ABS. NEUTROPHILS 13.8 (H) 1.8 - 8.0 K/UL    ABS. LYMPHOCYTES 1.4 0.8 - 3.5 K/UL    ABS. MONOCYTES 1.4 (H) 0 - 1.0 K/UL    ABS. EOSINOPHILS 0.0 0.0 - 0.4 K/UL    ABS.  BASOPHILS 0.0 0.0 - 0.1 K/UL    RBC COMMENTS NORMOCYTIC, NORMOCHROMIC      DF MANUAL     CARDIAC PANEL,(CK, CKMB & TROPONIN)    Collection Time: 11/25/19  1:45 AM   Result Value Ref Range     26 - 192 U/L    CK - MB 5.8 (H) <3.6 ng/ml    CK-MB Index 3.2 0.0 - 4.0 %    Troponin-I, QT 0.04 0.0 - 0.045 NG/ML   CARDIAC PANEL,(CK, CKMB & TROPONIN)    Collection Time: 11/25/19  7:30 AM   Result Value Ref Range     26 - 192 U/L    CK - MB 4.3 (H) <3.6 ng/ml    CK-MB Index 2.8 0.0 - 4.0 %    Troponin-I, QT 0.06 (H) 0.0 - 0.045 NG/ML   ECHO ADULT COMPLETE    Collection Time: 11/25/19 10:06 AM   Result Value Ref Range    LA Volume 42.33 22 - 52 mL    Right Atrial Area 4C 14.33 cm2    Ao Root D 3.27 cm    AO ASC D 2.99 cm    Aortic Valve Systolic Peak Velocity 719.74 cm/s    AoV VTI 30.61 cm    Aortic Valve Area by Continuity of Peak Velocity 1.8 cm2    Aortic Valve Area by Continuity of VTI 2.1 cm2    AoV PG 12.9 mmHg    LVIDd 5.55 (A) 3.9 - 5.3 cm    LVPWd 1.21 (A) 0.6 - 0.9 cm    LVIDs 4.85 cm    IVSd 1.14 (A) 0.6 - 0.9 cm    LV ED Vol A2C 85.3 mL    LV ES Vol A4C 37.7 mL    LV ES Vol BP 35.8 19 - 49 mL    LVOT d 2.00 cm    LVOT Peak Velocity 105.05 cm/s    LVOT Peak Gradient 4.4 mmHg    LVOT VTI 20.05 cm    LV E' Septal Velocity 4.00 cm/s    LV E' Lateral Velocity 4.00 cm/s    MVA (PHT) 3.7 cm2    MV A Dionisio 111.18 cm/s    MV E Dionisio 79.91 cm/s    MV E/A 0.72     RVIDd 3.44 cm    Aortic Valve Systolic Mean Gradient 7.6 mmHg    BP EF 57.2 55 - 100 %    LV Ejection Fraction MOD 4C 52 %    LV Ejection Fraction MOD 2C 62 %    LA Vol 4C 29.91 22 - 52 mL    LA Vol 2C 46.76 22 - 52 mL    LV Mass .7 (A) 67 - 162 g    LV Mass AL Index 173.7 (A) 43 - 95 g/m2    E/E' lateral 19.98     E/E' septal 19.98     TAPSV 2.4 cm/s    IVC proximal 1.82 cm    E/E' ratio (averaged) 19.98     LV ES Vol A2C 32.1 mL    LVES Vol Index BP 19.3 mL/m2    LV ED Vol A4C 78.6 mL    LVED Vol Index BP 45.0 mL/m2    Mitral Valve E-point Septal Separation 13.3 cm    Mitral Valve E Wave Deceleration Time 202.9 ms    Mitral Valve Pressure Half-time 58.8 ms    Left Atrium Major Axis 3.77 cm    Triscuspid Valve Regurgitation Peak Gradient 28.7 mmHg    Aortic Regurgitant Pressure Half-time 392.5 cm    LV ED Vol BP 83.6 56 - 104 ml    TR Max Velocity 267.88 cm/s    LA Vol Index 22.78 16 - 28 ml/m2    LA Vol Index 25.17 16 - 28 ml/m2    LA Vol Index 16.10 16 - 28 ml/m2    LVED Vol Index A4C 42.3 mL/m2    LVED Vol Index A2C 45.9 mL/m2    LVES Vol Index A4C 20.3 mL/m2    LVES Vol Index A2C 17.3 mL/m2    TWIN/BSA Pk Dionisio 1.0 cm2/m2    TWIN/BSA VTI 1.1 cm2/m2    AR Max Dionisio 398.85 cm/s    Left Ventricular Fractional Shortening by 2D 11.574867875 %    Left Ventricular Outflow Tract Mean Gradient 7.1581966853 mmHg    Left Ventricular Outflow Tract Mean Velocity 7.8946387898 cm/s    Mitral Valve Deceleration [place] 6.1951513961     AV Velocity Ratio 0.59     AV VTI Ratio 0.7     Aortic valve mean velocity 3.2259255132 m/s    AV peak gradient 51.257758266 mmHg    Left Ventricular End Diastolic Volume by Teichholz Method 7.97191966016 mL    Left Ventricular End Systolic Volume by Teichholz Method 1.33881707433 mL    Left Ventricular Stroke Volume by 2-D Biplane-MOD 81.000909522 mL    Left Ventricular Stroke Volume by 2-D Single Plane- MOD 18.987115342 mL    Left Ventricular Stroke Volume by Teichholz Method 39.34827611 mL    Left Ventricular Stroke Volume by 2-D Single Plane- MOD 03.135559508 mL   INFLUENZA A & B AG (RAPID TEST)    Collection Time: 11/25/19  3:45 PM   Result Value Ref Range    Influenza A Antigen NEGATIVE  NEG      Influenza B Antigen NEGATIVE  NEG     METABOLIC PANEL, BASIC    Collection Time: 11/26/19  4:11 AM   Result Value Ref Range    Sodium 135 (L) 136 - 145 mmol/L    Potassium 3.7 3.5 - 5.5 mmol/L    Chloride 103 100 - 111 mmol/L    CO2 24 21 - 32 mmol/L    Anion gap 8 3.0 - 18 mmol/L    Glucose 93 74 - 99 mg/dL    BUN 25 (H) 7.0 - 18 MG/DL    Creatinine 1.35 (H) 0.6 - 1.3 MG/DL BUN/Creatinine ratio 19 12 - 20      GFR est AA 45 (L) >60 ml/min/1.73m2    GFR est non-AA 37 (L) >60 ml/min/1.73m2    Calcium 9.5 8.5 - 10.1 MG/DL   MAGNESIUM    Collection Time: 11/26/19  4:11 AM   Result Value Ref Range    Magnesium 2.6 1.6 - 2.6 mg/dL   CBC WITH AUTOMATED DIFF    Collection Time: 11/26/19  4:11 AM   Result Value Ref Range    WBC 13.0 4.6 - 13.2 K/uL    RBC 3.60 (L) 4.20 - 5.30 M/uL    HGB 10.8 (L) 12.0 - 16.0 g/dL    HCT 33.3 (L) 35.0 - 45.0 %    MCV 92.5 74.0 - 97.0 FL    MCH 30.0 24.0 - 34.0 PG    MCHC 32.4 31.0 - 37.0 g/dL    RDW 13.5 11.6 - 14.5 %    PLATELET 780 612 - 122 K/uL    MPV 9.9 9.2 - 11.8 FL    NEUTROPHILS 77 (H) 40 - 73 %    LYMPHOCYTES 11 (L) 21 - 52 %    MONOCYTES 9 3 - 10 %    EOSINOPHILS 3 0 - 5 %    BASOPHILS 0 0 - 2 %    ABS. NEUTROPHILS 10.0 (H) 1.8 - 8.0 K/UL    ABS. LYMPHOCYTES 1.5 0.9 - 3.6 K/UL    ABS. MONOCYTES 1.2 0.05 - 1.2 K/UL    ABS. EOSINOPHILS 0.3 0.0 - 0.4 K/UL    ABS.  BASOPHILS 0.0 0.0 - 0.1 K/UL    DF AUTOMATED             Maryanne Ochoa MD

## 2019-11-26 NOTE — PROGRESS NOTES
Problem: Risk for Spread of Infection  Goal: Prevent transmission of infectious organism to others  Description  Prevent the transmission of infectious organisms to other patients, staff members, and visitors. Outcome: Progressing Towards Goal     Problem: Patient Education:  Go to Education Activity  Goal: Patient/Family Education  Outcome: Progressing Towards Goal     Problem: Pain  Goal: *Control of Pain  Outcome: Progressing Towards Goal  Goal: *PALLIATIVE CARE:  Alleviation of Pain  Outcome: Progressing Towards Goal     Problem: Patient Education: Go to Patient Education Activity  Goal: Patient/Family Education  Outcome: Progressing Towards Goal     Problem: Pressure Injury - Risk of  Goal: *Prevention of pressure injury  Description  Document Dilip Scale and appropriate interventions in the flowsheet. Outcome: Progressing Towards Goal  Note: Pressure Injury Interventions:       Moisture Interventions: Absorbent underpads, Check for incontinence Q2 hours and as needed, Internal/External urinary devices, Minimize layers, Moisture barrier    Activity Interventions: Increase time out of bed, Pressure redistribution bed/mattress(bed type), PT/OT evaluation    Mobility Interventions: PT/OT evaluation, Pressure redistribution bed/mattress (bed type), HOB 30 degrees or less    Nutrition Interventions: Document food/fluid/supplement intake, Offer support with meals,snacks and hydration                     Problem: Patient Education: Go to Patient Education Activity  Goal: Patient/Family Education  Outcome: Progressing Towards Goal     Problem: Falls - Risk of  Goal: *Absence of Falls  Description  Document Yanci Fall Risk and appropriate interventions in the flowsheet.   Outcome: Progressing Towards Goal  Note: Fall Risk Interventions:  Mobility Interventions: Bed/chair exit alarm, Communicate number of staff needed for ambulation/transfer, Patient to call before getting OOB, Utilize walker, cane, or other assistive device         Medication Interventions: Bed/chair exit alarm, Patient to call before getting OOB, Teach patient to arise slowly    Elimination Interventions: Bed/chair exit alarm, Call light in reach, Patient to call for help with toileting needs, Stay With Me (per policy), Toilet paper/wipes in reach, Toileting schedule/hourly rounds              Problem: Patient Education: Go to Patient Education Activity  Goal: Patient/Family Education  Outcome: Progressing Towards Goal     Problem: Management of Known or Suspected C. difficile  Goal: Minimize complications of C.difficile infection and prevent spread of infection  Outcome: Progressing Towards Goal  Goal: Patient/Family Education  Outcome: Progressing Towards Goal     Problem: Patient Education: Go to Patient Education Activity  Goal: Patient/Family Education  Outcome: Progressing Towards Goal

## 2019-11-26 NOTE — ROUTINE PROCESS
Assume care of patient from [facility]. Patient received in bed awake. Patient A&Ox4, denies pain and discomfort. No distress noted. Enteric contact measures in place. Frequently use items within reach. Bed locked in low position. Call bell within reach and patient verbalized understanding of use for assistance and needs. [phone]- Bedside and Verbal shift change report given to [employer], RN (oncoming nurse) by Rachel Bliss RN (offgoing nurse). Report included the following information SBAR, Kardex, Intake/Output, MAR and Recent Results.

## 2019-11-26 NOTE — PROGRESS NOTES
Problem: Self Care Deficits Care Plan (Adult)  Goal: *Acute Goals and Plan of Care (Insert Text)  Description  Occupational Therapy Goals  Initiated 11/26/2019 within 7 day(s). 1.  Patient will perform grooming tasks while standing with modified independence and < 3 rest breaks. 2.  Patient will perform lower body dressing with modified independence utilizing adaptive strategies, prn.  3.  Patient will perform functional task in standing for 8 minutes with fair+ dynamic standing balance and supervision in prep for ADLs. 4.  Patient will perform toilet transfers with modified independence. 5.  Patient will perform all aspects of toileting with modified independence. 6.  Patient will participate in upper extremity therapeutic exercise/activities with supervision/set-up for 8 minutes to maintain/increase BUE strength for functional transfers & ADLs. 7.  Patient will utilize energy conservation techniques during functional activities with minimal verbal cues. Outcome: Progressing Towards Goal     OCCUPATIONAL THERAPY EVALUATION    Patient: Jayesh Lassiter (17 y.o. female)  Date: 11/26/2019  Primary Diagnosis: Pneumonia [J18.9]  [facility]congest[facility] failure[facility] ([facility] [I50.9]  Diarrhea [R19.7]        Precautions:  Fall  PLOF: Pt reports independence with ADLs; rollator for functional mobility. ASSESSMENT :  Based on the objective data described below, the patient presents with SBA & supervision for bed mobility, functional transfers & ADLs. Pt reports independence PTA; lives with dtr. SOB impacting pt's participation this session. SBA & additional time for bed mobility. Fair+/fair sitting balance at EOB. Good BUE AROM/strength. Min A to manage R sock utilizing cross legged method. CGA/min A to stand from EOB & side step in prep for [facility] transfer. Skilled instruction with regard to safety & BUE positioning. Pt returned supine with HOB elevated and needs within reach. Dtr at bedside. Bed alarm on for safety. Recommend EOB for meals. Recommend HH upon d/c. Patient will benefit from skilled intervention to address the above impairments. Patient's rehabilitation potential is considered to be Fair  Factors which may influence rehabilitation potential include:   []             None noted  []             Mental ability/status  [x]             Medical condition  []             Home/family situation and support systems  []             Safety awareness  []             Pain tolerance/management  []             Other:      PLAN :  Recommendations and Planned Interventions:   [x]               Self Care Training                  [x]      Therapeutic Activities  [x]               Functional Mobility Training   []      Cognitive Retraining  [x]               Therapeutic Exercises           [x]      Endurance Activities  [x]               Balance Training                    []      Neuromuscular Re-Education  []               Visual/Perceptual Training     [x]      Home Safety Training  [x]               Patient Education                   [x]      Family Training/Education  []               Other (comment):    Frequency/Duration: Patient will be followed by occupational therapy 3-5 times a week to address goals. Discharge Recommendations: Home Health  Further Equipment Recommendations for Discharge: anticipate none     SUBJECTIVE:   Patient stated I had PT a few years ago.     OBJECTIVE DATA SUMMARY:     Past Medical History:   Diagnosis Date    Adult hypothyroidism     Arthritis     Cancer ([facility]     Hypertension     Osteoporosis, idiopathic     Sciatica     Spinal stenosis     Squamous cell cancer of skin of forearm, right      Past Surgical History:   Procedure Laterality Date    HX APPENDECTOMY      HX CATARACT REMOVAL      HX CHOLECYSTECTOMY      HX DILATION AND CURETTAGE      HX KNEE ARTHROSCOPY      HX MOHS PROCEDURES      HX PARTIAL THYROIDECTOMY      HX REFRACTIVE SURGERY       Barriers to Learning/Limitations: None  Compensate with: visual, verbal, tactile, kinesthetic cues/model    Home Situation:   Home Situation  Home Environment: Private residence  # Steps to Enter: 1  Rails to Enter: No  Wheelchair Ramp: Yes  One/Two Story Residence: Two story, live on 1st floor  Living Alone: No  Support Systems: Child(daniela)  Patient Expects to be Discharged to[de-identified] Private residence  Current DME Used/Available at Home: Grab bars, Walker, rollator, Shower chair, Commode, bedside  Tub or Shower Type: Shower(has grab bars & shower chair)  [x]  Right hand dominant   []  Left hand dominant    Cognitive/Behavioral Status:  Neurologic State: Alert  Orientation Level: Oriented X4  Cognition: Follows commands  Safety/Judgement: Awareness of environment; Fall prevention    Skin: intact (BUEs)  Edema: None noted (BUEs)     Vision/Perceptual:    Acuity: Within Defined Limits      Coordination: BUE  Coordination: Within functional limits  Fine Motor Skills-Upper: Right Intact; Left Intact    Gross Motor Skills-Upper: Right Intact; Left Intact    Balance:  Sitting: Intact  Standing: Impaired; With support  Standing - Static: Fair(Fair+)  Standing - Dynamic : Fair    Strength: BUE  Strength: Within functional limits    Tone & Sensation: BUE  Tone: Normal  Sensation: Intact    Range of Motion: BUE  AROM: Within functional limits    Functional Mobility and Transfers for ADLs:  Bed Mobility:  Supine to Sit: Stand-by assistance; Additional time  Sit to Supine: Stand-by assistance; Additional time    Transfers:  Sit to Stand: Contact guard assistance  Stand to Sit: Minimum assistance(due to decreased control during descent)   Toilet Transfer : (not assessed)    ADL Assessment:   Feeding: Modified independent  Oral Facial Hygiene/Grooming: Setup;Supervision  Bathing: Minimum assistance  Upper Body Dressing: Setup;Supervision  Lower Body Dressing: Minimum assistance  Toileting: Setup;Supervision    Cognitive Retraining  Safety/Judgement: Awareness of environment; Fall prevention    Therapeutic Activity:   2 sit to stands with RW and fair standing balance in prep for ADLs while standing at sink. Side stepping towards HOB in prep for shower transfer & toilet transfer. Skilled instruction on BUE positioning during transfers for safety. Pain:  Pain level pre-treatment: 0/10   Pain level post-treatment: 0/10     Activity Tolerance: Fair  Please refer to the flowsheet for vital signs taken during this treatment. After treatment:   [] Patient left in no apparent distress sitting up in chair  [x] Patient left in no apparent distress in bed  [x] Call bell left within reach  [x] Nursing notified  [] Caregiver present  [] Bed alarm activated    COMMUNICATION/EDUCATION:   [x] Role of Occupational Therapy in the acute care setting  [x] Home safety education was provided and the patient/caregiver indicated understanding. [x] Patient/family have participated as able in goal setting and plan of care. [] Patient/family agree to work toward stated goals and plan of care. [] Patient understands intent and goals of therapy, but is neutral about his/her participation. [] Patient is unable to participate in goal setting and plan of care. Thank you for this referral.  Dileep Parkinson MS OTR/L  Time Calculation: 23 mins    Eval Complexity: History: MEDIUM Complexity : Expanded review of history including physical, cognitive and psychosocial  history ; Examination: MEDIUM Complexity : 3-5 performance deficits relating to physical, cognitive , or psychosocial skils that result in activity limitations and / or participation restrictions; Decision Making:MEDIUM Complexity : Patient may present with comorbidities that affect occupational performnce.  Miniml to moderate modification of tasks or assistance (eg, physical or verbal ) with assesment(s) is necessary to enable patient to complete evaluation

## 2019-11-27 ENCOUNTER — HOME HEALTH ADMISSION (OUTPATIENT)
Dept: HOME HEALTH SERVICES | Facility: HOME HEALTH | Age: 84
End: 2019-11-27
Payer: MEDICARE

## 2019-11-27 VITALS
TEMPERATURE: 97.3 F | OXYGEN SATURATION: 96 % | DIASTOLIC BLOOD PRESSURE: 54 MMHG | RESPIRATION RATE: 16 BRPM | SYSTOLIC BLOOD PRESSURE: 128 MMHG | HEIGHT: 63 IN | HEART RATE: 58 BPM | WEIGHT: 191.14 LBS | BODY MASS INDEX: 33.87 KG/M2

## 2019-11-27 LAB
ANION GAP SERPL CALC-SCNC: 9 MMOL/L (ref 3–18)
BASOPHILS # BLD: 0 K/UL (ref 0–0.1)
BASOPHILS NFR BLD: 0 % (ref 0–2)
BUN SERPL-MCNC: 19 MG/DL (ref 7–18)
BUN/CREAT SERPL: 16 (ref 12–20)
CALCIUM SERPL-MCNC: 8.8 MG/DL (ref 8.5–10.1)
CHLORIDE SERPL-SCNC: 103 MMOL/L (ref 100–111)
CO2 SERPL-SCNC: 25 MMOL/L (ref 21–32)
CREAT SERPL-MCNC: 1.19 MG/DL (ref 0.6–1.3)
DIFFERENTIAL METHOD BLD: ABNORMAL
EOSINOPHIL # BLD: 0.5 K/UL (ref 0–0.4)
EOSINOPHIL NFR BLD: 4 % (ref 0–5)
ERYTHROCYTE [DISTWIDTH] IN BLOOD BY AUTOMATED COUNT: 13.5 % (ref 11.6–14.5)
GLUCOSE BLD STRIP.AUTO-MCNC: 122 MG/DL (ref 70–110)
GLUCOSE BLD STRIP.AUTO-MCNC: 146 MG/DL (ref 70–110)
GLUCOSE BLD STRIP.AUTO-MCNC: 98 MG/DL (ref 70–110)
GLUCOSE SERPL-MCNC: 95 MG/DL (ref 74–99)
HCT VFR BLD AUTO: 31.1 % (ref 35–45)
HGB BLD-MCNC: 10.2 G/DL (ref 12–16)
LYMPHOCYTES # BLD: 1.1 K/UL (ref 0.9–3.6)
LYMPHOCYTES NFR BLD: 10 % (ref 21–52)
MAGNESIUM SERPL-MCNC: 2.1 MG/DL (ref 1.6–2.6)
MCH RBC QN AUTO: 30.3 PG (ref 24–34)
MCHC RBC AUTO-ENTMCNC: 32.8 G/DL (ref 31–37)
MCV RBC AUTO: 92.3 FL (ref 74–97)
MONOCYTES # BLD: 1.1 K/UL (ref 0.05–1.2)
MONOCYTES NFR BLD: 11 % (ref 3–10)
NEUTS SEG # BLD: 7.7 K/UL (ref 1.8–8)
NEUTS SEG NFR BLD: 75 % (ref 40–73)
PLATELET # BLD AUTO: 334 K/UL (ref 135–420)
PMV BLD AUTO: 9.4 FL (ref 9.2–11.8)
POTASSIUM SERPL-SCNC: 4.1 MMOL/L (ref 3.5–5.5)
RBC # BLD AUTO: 3.37 M/UL (ref 4.2–5.3)
SODIUM SERPL-SCNC: 137 MMOL/L (ref 136–145)
WBC # BLD AUTO: 10.4 K/UL (ref 4.6–13.2)

## 2019-11-27 PROCEDURE — 97116 GAIT TRAINING THERAPY: CPT

## 2019-11-27 PROCEDURE — 74011250636 HC RX REV CODE- 250/636: Performed by: HOSPITALIST

## 2019-11-27 PROCEDURE — 74011250637 HC RX REV CODE- 250/637: Performed by: HOSPITALIST

## 2019-11-27 PROCEDURE — 85025 COMPLETE CBC W/AUTO DIFF WBC: CPT

## 2019-11-27 PROCEDURE — 80048 BASIC METABOLIC PNL TOTAL CA: CPT

## 2019-11-27 PROCEDURE — 97530 THERAPEUTIC ACTIVITIES: CPT

## 2019-11-27 PROCEDURE — 36415 COLL VENOUS BLD VENIPUNCTURE: CPT

## 2019-11-27 PROCEDURE — 74011000258 HC RX REV CODE- 258: Performed by: HOSPITALIST

## 2019-11-27 PROCEDURE — 82962 GLUCOSE BLOOD TEST: CPT

## 2019-11-27 PROCEDURE — 77030027138 HC INCENT SPIROMETER -A

## 2019-11-27 PROCEDURE — 83735 ASSAY OF MAGNESIUM: CPT

## 2019-11-27 RX ORDER — AMLODIPINE BESYLATE 10 MG/1
10 TABLET ORAL DAILY
Qty: 30 TAB | Refills: 0 | Status: SHIPPED | OUTPATIENT
Start: 2019-11-28

## 2019-11-27 RX ORDER — LISINOPRIL 10 MG/1
10 TABLET ORAL DAILY
Qty: 30 TAB | Refills: 0 | Status: SHIPPED | OUTPATIENT
Start: 2019-11-27

## 2019-11-27 RX ORDER — FUROSEMIDE 20 MG/1
20 TABLET ORAL DAILY
Qty: 30 TAB | Refills: 0 | Status: SHIPPED | OUTPATIENT
Start: 2019-11-27

## 2019-11-27 RX ORDER — AMOXICILLIN AND CLAVULANATE POTASSIUM 875; 125 MG/1; MG/1
1 TABLET, FILM COATED ORAL 2 TIMES DAILY
Qty: 10 TAB | Refills: 0 | Status: SHIPPED | OUTPATIENT
Start: 2019-11-27 | End: 2019-12-02

## 2019-11-27 RX ORDER — CARVEDILOL 12.5 MG/1
12.5 TABLET ORAL 2 TIMES DAILY WITH MEALS
Qty: 60 TAB | Refills: 0 | Status: SHIPPED | OUTPATIENT
Start: 2019-11-27

## 2019-11-27 RX ADMIN — CARVEDILOL 12.5 MG: 12.5 TABLET, FILM COATED ORAL at 09:14

## 2019-11-27 RX ADMIN — NYSTATIN: 100000 POWDER TOPICAL at 09:14

## 2019-11-27 RX ADMIN — AMLODIPINE BESYLATE 10 MG: 5 TABLET ORAL at 09:14

## 2019-11-27 RX ADMIN — Medication 1 TABLET: at 09:13

## 2019-11-27 RX ADMIN — LEVOTHYROXINE SODIUM 100 MCG: 100 TABLET ORAL at 07:19

## 2019-11-27 RX ADMIN — HEPARIN SODIUM 5000 UNITS: 5000 INJECTION INTRAVENOUS; SUBCUTANEOUS at 03:04

## 2019-11-27 RX ADMIN — PIPERACILLIN AND TAZOBACTAM 3.38 G: 3; .375 INJECTION, POWDER, LYOPHILIZED, FOR SOLUTION INTRAVENOUS at 07:19

## 2019-11-27 RX ADMIN — GABAPENTIN 200 MG: 100 CAPSULE ORAL at 09:13

## 2019-11-27 RX ADMIN — FUROSEMIDE 20 MG: 10 INJECTION, SOLUTION INTRAMUSCULAR; INTRAVENOUS at 09:13

## 2019-11-27 RX ADMIN — HEPARIN SODIUM 5000 UNITS: 5000 INJECTION INTRAVENOUS; SUBCUTANEOUS at 11:19

## 2019-11-27 RX ADMIN — FAMOTIDINE 40 MG: 20 TABLET ORAL at 09:13

## 2019-11-27 NOTE — ROUTINE PROCESS
Bedside shift change report given to [facility] RN (oncoming nurse) by Ninoska Wilson RN (offgoing nurse). Report included the following information SBAR, Kardex, Intake/Output and MAR.

## 2019-11-27 NOTE — DISCHARGE INSTRUCTIONS
DISCHARGE SUMMARY from Nurse    PATIENT INSTRUCTIONS:      What to do at Home:  Recommended activity: Activity as tolerated,      *  Please give a list of your current medications to your Primary Care Provider. *  Please update this list whenever your medications are discontinued, doses are      changed, or new medications (including over-the-counter products) are added. *  Please carry medication information at all times in case of emergency situations. These are general instructions for a healthy lifestyle:    No smoking/ No tobacco products/ Avoid exposure to second hand smoke  Surgeon General's Warning:  Quitting smoking now greatly reduces serious risk to your health. Obesity, smoking, and sedentary lifestyle greatly increases your risk for illness    A healthy diet, regular physical exercise & weight monitoring are important for maintaining a healthy lifestyle    You may be retaining fluid if you have a history of heart failure or if you experience any of the following symptoms:  Weight gain of 3 pounds or more overnight or 5 pounds in a week, increased swelling in our hands or feet or shortness of breath while lying flat in bed. Please call your doctor as soon as you notice any of these symptoms; do not wait until your next office visit. The discharge information has been reviewed with the patient. The patient verbalized understanding. Discharge medications reviewed with the patient and appropriate educational materials and side effects teaching were provided.   ___________________________________________________________________________________________________________________________________

## 2019-11-27 NOTE — DISCHARGE SUMMARY
Discharge Summary    Patient: Melissa Carey MRN: [id number]  CSN: [id number]    YOB: 1931  Age: 80 y.o. Sex: female    DOA: 11/24/2019 LOS:  LOS: 3 days   Discharge Date:      Primary Care Provider:  Louise Domínguez MD    Admission Diagnoses: Pneumonia [J18.9]  CHF (congestive heart failure) ([place] [address] [I50.9]  Diarrhea [R19.7]    Discharge Diagnoses:    Hospital Problems  Never Reviewed          Codes Class Noted POA    Acute on chronic combined systolic and diastolic CHF (congestive heart failure) ([place] [address] ICD-10-CM: I50.43  ICD-9-CM: 428.43, 428.0  11/26/2019 Unknown        Hypomagnesemia ICD-10-CM: E83.42  ICD-9-CM: 275.2  11/25/2019 Unknown        Hypokalemia ICD-10-CM: E87.6  ICD-9-CM: 276.8  11/25/2019 Unknown        Diarrhea ICD-10-CM: R19.7  ICD-9-CM: 787.91  11/24/2019 Unknown        * (Principal) Pneumonia ICD-10-CM: J18.9  ICD-9-CM: 486  11/24/2019 Unknown        Hypertension ICD-10-CM: I10  ICD-9-CM: 401.9  11/24/2019 Unknown        Adult hypothyroidism ICD-10-CM: E03.9  ICD-9-CM: 244.9  11/24/2019 Unknown        Elevated brain natriuretic peptide (BNP) level ICD-10-CM: R79.89  ICD-9-CM: 790.99  11/24/2019 Unknown        Elevated troponin ICD-10-CM: R79.89  ICD-9-CM: 790.6  11/24/2019 Unknown        UTI (urinary tract infection) ICD-10-CM: N39.0  ICD-9-CM: 599.0  11/24/2019 Unknown        Stage 3 chronic kidney disease ([facility] ICD-10-CM: N18.3  ICD-9-CM: 585.3  11/24/2019 Unknown              Discharge Condition: stable     Discharge Medications:     Current Discharge Medication List      START taking these medications    Details   amLODIPine (NORVASC) 10 mg tablet Take 1 Tab by mouth daily. Qty: 30 Tab, Refills: 0      carvedilol (COREG) 12.5 mg tablet Take 1 Tab by mouth two (2) times daily (with meals). Qty: 60 Tab, Refills: 0      amoxicillin-clavulanate (AUGMENTIN) 875-125 mg per tablet Take 1 Tab by mouth two (2) times a day for 5 days.   Qty: 10 Tab, Refills: 0      lisinopril (PRINIVIL, ZESTRIL) 10 mg tablet Take 1 Tab by mouth daily. Qty: 30 Tab, Refills: 0         CONTINUE these medications which have CHANGED    Details   furosemide (LASIX) 20 mg tablet Take 1 Tab by mouth daily. Qty: 30 Tab, Refills: 0         CONTINUE these medications which have NOT CHANGED    Details   calcium-cholecalciferol, D3, (CALTRATE 600+D) tablet Take 1 Tab by mouth two (2) times a day. levothyroxine (SYNTHROID) 100 mcg tablet Take 100 mcg by mouth Daily (before breakfast). gabapentin (NEURONTIN) 100 mg capsule Take 200 mg by mouth three (3) times daily. loratadine (CLARITIN) 10 mg tablet Take 10 mg by mouth daily. famotidine (PEPCID) 20 mg tablet Take 40 mg by mouth two (2) times a day. STOP taking these medications       trandolapril-verapamil (TARKA) 4-240 mg per tablet Comments:   Reason for Stopping:         POTASSIUM CHLORIDE PO Comments:   Reason for Stopping:               Procedures : none     Consults: Cardiology      PHYSICAL EXAM   Visit Vitals  /54   Pulse (!) 58   Temp 97.3 °F (36.3 °C)   Resp 16   Ht 5' 3\" (1.6 m)   Wt 86.7 kg (191 lb 2.2 oz)   SpO2 96%   BMI 33.86 kg/m²     General: Awake, cooperative, no acute distress    HEENT: NC, Atraumatic. PERRLA, EOMI. Anicteric sclerae. Lungs:  CTA Bilaterally. No Wheezing/Rhonchi/Rales. Heart:  Regular  rhythm,  No murmur, No Rubs, No Gallops  Abdomen: Soft, Non distended, Non tender. +Bowel sounds,   Extremities: No c/c/e  Psych:   Not anxious or agitated. Neurologic:  No acute neurological deficits. Admission HPI :   Kuldip Quesada is a 80 y.o. female with a past medical history of hypertension, CKD 3 was sent to ER due to diarrhea and fever. She noticed she has a fever about 2 to 3 days ago. She did not tell her daughter until today she has some shortness of breath associated with the fever/cough. She has no chest pain.   Chest x-ray indicated infiltrated of right lungs.  She also has chronic on and off diarrhea. But her diarrhea becomes worsening for  1 week. She has 6 bowel movement per day. She and her daughter traveled around the country for one month for driving. Her daughter has been on oral antibiotics for her pneumonia. She denies any pain in legs. Denies any slurred speech/headache/cp/n/v/blurred vission/d/c/palpitation/gait change/bleeding. Also reported decreased appetite    Hospital Course :   Moraima Chávez is a 80 y.o. female with a past medical history of hypertension, CKD 3 was admitted to due to pneumonia and CHF exacerbation combined diastolic and systolic. Since she was admitted,  IV antibiotics she was  started for pneumonia. CT chest confirmed that pneumonia. Cardiology was on board for CHF, Lasix was giving and medication was optimized for CHF exacerbation. On discharge, she was able to wean off NC oxygen and  shortness of breath resolved    She also presented acute on chronic renal failure, resolved on discharge. VQ scan was negative for PE. She has  chronic on and off  diarrhea with constipation, recommended to see gastrologist as outpatient. Stool study was  negative for C. difficile and the culture was negative. Ct abdomen no acute issue. Recommended to go home with Imodium as needed. Discharge planning discussed with patient and family, agree with the plan and no questions and concerns at this point.        Activity: Activity as tolerated    Diet: Cardiac Diet    Follow-up: PCP and Dr. Dang Both     Disposition: home     Minutes spent on discharge: 45 min       Labs: Results:       Chemistry Recent Labs     11/27/19  0030 11/26/19  0411 11/25/19  0145 11/24/19  1630   GLU 95 93 110* 145*    135* 132* 131*   K 4.1 3.7 3.0* 3.5    103 97* 97*   CO2 25 24 23 21   BUN 19* 25* 26* 24*   CREA 1.19 1.35* 1.60* 1.86*   CA 8.8 9.5 9.1 9.2   AGAP 9 8 12 13   BUCR 16 19 16 13   AP  --   --   --  101   TP  --   --   --  7.1   ALB  -- --   --  2.8*   GLOB  --   --   --  4.3*   AGRAT  --   --   --  0.7*      CBC w/Diff Recent Labs     11/27/19  0030 11/26/19  0411 11/25/19  0145   WBC 10.4 13.0 17.0*   RBC 3.37* 3.60* 3.72*   HGB 10.2* 10.8* 11.4*   HCT 31.1* 33.3* 34.0*    337 286   GRANS 75* 77* 81*   LYMPH 10* 11* 8*   EOS 4 3 0      Cardiac Enzymes Recent Labs     11/25/19  0730 11/25/19  0145    181   CKND1 2.8 3.2      Coagulation No results for input(s): PTP, INR, APTT, INREXT in the last 72 hours. Lipid Panel No results found for: CHOL, CHOLPOCT, CHOLX, CHLST, CHOLV, 764522, HDL, HDLP, LDL, LDLC, DLDLP, 855820, VLDLC, VLDL, TGLX, TRIGL, TRIGP, TGLPOCT, CHHD, CHHDX   BNP No results for input(s): BNPP in the last 72 hours. Liver Enzymes Recent Labs     11/24/19  1630   TP 7.1   ALB 2.8*      SGOT 30      Thyroid Studies No results found for: T4, T3U, TSH, TSHEXT         Significant Diagnostic Studies: Nm Lung Vent/perf    Result Date: 11/25/2019  Ventilation-perfusion lung scan History:  Shortness of breath and elevated d-dimer Comparison:  Chest radiograph 11/24/2019 Technique: Ventilation imaging was performed after inhalation of 0.8 millicuries of Tc 93F DTPA with a nebulizer followed by imaging in multiple projections. Perfusion imaging was performed after intravenous injection of 6.8 millicuries of Tc 63M MAA followed by imaging in multiple projections. Injection site: Left forearm vein Findings: Ventilation imaging shows no significant ventilation defects. Perfusion imaging shows mild inhomogeneity along the peripheral aspect of the lungs. No focal segmental perfusion defect is seen. No perfusion mismatches are present. Impression: Low probability for pulmonary embolism     Ct Chest Abd Pelv Wo Cont    Result Date: 11/24/2019  EXAM: CT of the chest, abdomen, and pelvis CLINICAL INDICATION/HISTORY: Cough and dyspnea. Diarrhea. COMPARISON: 11/24/2019 chest x-ray.  TECHNIQUE: Axial CT imaging of the chest, abdomen, and pelvis was performed with intravenous contrast. Multiplanar reformats were generated. One or more dose reduction techniques were used on this CT: automated exposure control, adjustment of the mAs and/or kVp according to patient size, and iterative reconstruction techniques. The specific techniques used on this CT exam have been documented in the patient's electronic medical record. Digital Imaging and Communications in Medicine (DICOM) format image data are available to nonaffiliated external healthcare facilities or entities on a secure, media free, reciprocally searchable basis with patient authorization for at least a 12-month period after this study. _______________ FINDINGS: CHEST: LUNGS: Consolidation in the right upper and right middle lobes with lesser atelectasis involving the dependent right lower lobe. The left lung is clear. No suspicious nodule or mass evident. PLEURA: Small right pleural effusion. AIRWAY: Normal. MEDIASTINUM: Normal heart size. No pericardial effusion. Given the limitations of cardiac motion, great vessels are unremarkable. CHEST LYMPH NODES: No enlarged lymph nodes. ABDOMEN/PELVIS: LIVER, BILIARY: Liver is normal. No biliary dilation. Gallbladder is unremarkable. PANCREAS: Fatty atrophy of the pancreas. SPLEEN: Small calcific granulomas noted in the spleen. ADRENALS: 2.7 cm right adrenal fat density adenoma. KIDNEYS: Normal. ABDOMEN/PELVIS LYMPH NODES: No enlarged lymph nodes. GASTROINTESTINAL TRACT: No bowel dilation or wall thickening. Scattered colonic diverticulosis. PELVIC ORGANS: Unremarkable. VASCULATURE: Unremarkable. BONES: No acute or aggressive osseous abnormalities identified. There is generalized osseous demineralization. SUPERFICIAL SOFT TISSUES: Unremarkable. _______________     IMPRESSION: 1. Right lung pneumonic infiltrates with associated small right parapneumonic effusion.  2. Right adrenal gland adenoma is benign by imaging and requires no dedicated follow-up. 3. Colonic diverticulosis without acute diverticulitis. Xr Chest Port    Result Date: 11/24/2019  EXAM: One view chest x-ray CLINICAL INDICATION/HISTORY: Cough and fever. COMPARISON: No prior relevant study available for comparison. TECHNIQUE: Single AP view of the chest was obtained. _______________ FINDINGS: HEART, VESSELS, MEDIASTINUM: Heart size is normal. No vascular congestion. LUNGS, PLEURAL SPACES: Patchy airspace opacities involving the right upper and right lower lung. The left lung is clear. No effusion or pneumothorax. BONY THORAX, SOFT TISSUES: Unremarkable. _______________     IMPRESSION: Multifocal pneumonic infiltrates involving the right lung. Following a course of treatment, please repeat imaging to ensure complete resolution.             [facility]     CC: Ambrocio Bill MD

## 2019-11-27 NOTE — PROGRESS NOTES
Transition of care: anticipate home today  Met with patient, daughter and Dr. Clive Rodgers at bedside. Patient would like to go home today. patient lives with her daughter   She has been weaned from oxygen. She has Dr. Gonzalez Bernard for pcp. She has medicare for insurance    Transition of Care Plan:     The Plan for Transition of Care is related to the following treatment goals: transition home with daughter and [facility]    The Patient and/or patient representative daughter was provided with a choice of provider and agrees  with the discharge plan. Yes [x] No []    A Freedom of choice list was provided with basic dialogue that supports the patient's individualized plan of care/goals and shares the quality data associated with the providers. Yes [x] No []    these medications from [address] [facility] [address]    amLODIPine    amoxicillin-clavulanate    carvedilol    furosemide    lisinopril      [facility]    Next steps: Follow up    [phone]    Chosen to continue managing your healthcare needs. Follow up with Shanae Redding MD on 12/4/2019    Specialty: Internal Medicine    [facility]   [phone]    Follow up appointment @ 2:00pm  (This is the earliest appointment available)      Follow up with Shamika Blood MD on 1/29/2020    Specialty: Cardiology, Internal Medicine    [address]   [phone]    Follow up appointment @ 2:00pm  (This is the earliest appointment available.  Dr Reilly's office will call you if they can schedule an earlier appointment.)    Care Management Interventions  PCP Verified by CM:  Yes  Transition of Care Consult (CM Consult): [address]: Yes  Physical Therapy Consult: Yes  Occupational Therapy Consult: Yes  Current Support Network: Relative's Home  Confirm Follow Up Transport: Family  Plan discussed with Pt/Family/Caregiver: Yes  Freedom of Choice Offered: Yes  Discharge Location  Discharge Placement: Home with home health

## 2019-11-27 NOTE — PROGRESS NOTES
Bedside shift change report given to [address] [place] [address] (oncoming nurse) by Juan J Mcclain RN (offgoing nurse). Report included the following information SBAR, Kardex, ED Summary, Intake/Output, MAR, Accordion and Recent Results. 7061 Shift assessment complete. 1030 Patient weaned down to room air. Pulse ox is 94%. 1345 Patient tolerated working with physical therapy on room air. Pulse ox stayed above 90%. Dual AVS reviewed with Sarah Failing. All medications reviewed individually with patient. Opportunities for questions and concerns provided. Patient discharged via (mode of transport ie. Car, ambulance or air transport) car with daughter. Patient's arm band appropriately discarded. Shift Summary: Shift uneventful. No complaints of chest pain or shortness of breath. Call light is within reach.

## 2019-11-27 NOTE — PROGRESS NOTES
0045-Assessment complete. Stable. Periodic confusion noted. Tele box in place. Souche being utilized. No complaints voiced. Call light within reach. 0402-Stable. No change from initial assessment. Call light within reach. 0650-Changed for incontinence of bowel. No complaints. Quietly resting in . Call light within reach. Shift Summary: Uneventful shift. Stable at shift change.

## 2019-11-27 NOTE — ROUTINE PROCESS
Bedside and Verbal shift change report given to Chele Schwab RN (oncoming nurse) by Jarred Membreno RN (offgoing nurse). Report included the following information SBAR and Kardex.

## 2019-11-27 NOTE — ROUTINE PROCESS
Bedside and Verbal shift change report given to Michael Cedeno RN (oncoming nurse) by Marilou Leos RN (offgoing nurse). Report included the following information SBAR and Kardex.

## 2019-11-27 NOTE — PROGRESS NOTES
Problem: Mobility Impaired (Adult and Pediatric)  Goal: *Acute Goals and Plan of Care (Insert Text)  Description  Physical Therapy Goals  Initiated 11/25/2019 and to be accomplished within 3-7 day(s)  1. Patient will move from supine to sit and sit to supine  in bed with supervision/set-up. 2.  Patient will transfer from bed to chair and chair to bed with supervision/set-up using the least restrictive device. 3.  Patient will perform sit to stand with supervision/set-up. 4.  Patient will ambulate with supervision/set-up for 100 feet with the least restrictive device. 5.  Patient will ascend/descend 1 stairs without handrail(s) with minimal assistance/contact guard assist.   Outcome: Progressing Towards Goal   PHYSICAL THERAPY TREATMENT    Patient: Maulik Abbott (89 y.o. female)  Date: 11/27/2019  Diagnosis: Pneumonia [J18.9]  CHF (congestive heart failure) ([facility]) [I50.9]  Diarrhea [R19.7]   Pneumonia    Precautions: Fall   Chart, physical therapy assessment, plan of care and goals were reviewed. ASSESSMENT:  Pt in bed upon arrival, with CG present. Pt motivated to participate and return home today. Pt showing improved mobility and increasing ambulation distance with RW. Some VCs for RW management and safety. Pt uses a rollator at home. SpO2 90%-92% on exertion. Nursing provided IS to encourage deep breathing. Cont POC. Progression toward goals:  []      Improving appropriately and progressing toward goals  [x]      Improving slowly and progressing toward goals  []      Not making progress toward goals and plan of care will be adjusted     PLAN:  Patient continues to benefit from skilled intervention to address the above impairments. Continue treatment per established plan of care.   Discharge Recommendations:  Home Health w assistance   Further Equipment Recommendations for Discharge:  rolling walker     SUBJECTIVE:   Patient stated  I feel okay     OBJECTIVE DATA SUMMARY:   Critical Behavior:  Neurologic State: Alert  Orientation Level: Oriented X4  Cognition: Appropriate decision making, Appropriate for age attention/concentration, Appropriate safety awareness, Follows commands  Safety/Judgement: Awareness of environment, Fall prevention  Functional Mobility Training:  Bed Mobility:  Supine to Sit: Stand-by assistance    Transfers:  Sit to Stand: Contact guard assistance  Stand to Sit: Contact guard assistance    Balance:  Sitting: Intact  Standing: Impaired; With support  Standing - Static: Fair  Standing - Dynamic : Fair  Ambulation/Gait Training:  Distance (ft): 100 Feet (ft)  Assistive Device: Walker, rolling;Gait belt  Ambulation - Level of Assistance: Contact guard assistance  Gait Abnormalities: Decreased step clearance; Foot drop; Step to gait(R)  Base of Support: Narrowed  Speed/Mary: Slow  Step Length: Left shortened;Right shortened      Pain:  Pain Scale 1: Numeric (0 - 10)  Pain Intensity 1: 0    Activity Tolerance:   Fair     After treatment:   [] Patient left in no apparent distress sitting up in chair  [x] Patient left in no apparent distress in bed  [x] Call bell left within reach  [x] Nursing notified  [x] Caregiver present  [] Bed alarm activated      Pb Pablo PTA   Time Calculation: 26 mins

## 2019-11-28 LAB
BACTERIA SPEC CULT: NORMAL
SERVICE CMNT-IMP: NORMAL

## 2019-11-30 ENCOUNTER — HOME CARE VISIT (OUTPATIENT)
Dept: SCHEDULING | Facility: HOME HEALTH | Age: 84
End: 2019-11-30
Payer: MEDICARE

## 2019-11-30 PROCEDURE — 400013 HH SOC

## 2019-11-30 PROCEDURE — 3331090002 HH PPS REVENUE DEBIT

## 2019-11-30 PROCEDURE — 3331090001 HH PPS REVENUE CREDIT

## 2019-11-30 PROCEDURE — G0299 HHS/HOSPICE OF RN EA 15 MIN: HCPCS

## 2019-12-01 VITALS
SYSTOLIC BLOOD PRESSURE: 149 MMHG | DIASTOLIC BLOOD PRESSURE: 68 MMHG | HEART RATE: 61 BPM | RESPIRATION RATE: 17 BRPM | TEMPERATURE: 97.6 F | OXYGEN SATURATION: 94 %

## 2019-12-01 PROCEDURE — 3331090002 HH PPS REVENUE DEBIT

## 2019-12-01 PROCEDURE — 3331090001 HH PPS REVENUE CREDIT

## 2019-12-02 ENCOUNTER — HOME CARE VISIT (OUTPATIENT)
Dept: SCHEDULING | Facility: HOME HEALTH | Age: 84
End: 2019-12-02
Payer: MEDICARE

## 2019-12-02 VITALS
HEART RATE: 50 BPM | OXYGEN SATURATION: 95 % | SYSTOLIC BLOOD PRESSURE: 136 MMHG | RESPIRATION RATE: 16 BRPM | DIASTOLIC BLOOD PRESSURE: 49 MMHG

## 2019-12-02 VITALS — OXYGEN SATURATION: 94 % | SYSTOLIC BLOOD PRESSURE: 136 MMHG | DIASTOLIC BLOOD PRESSURE: 49 MMHG | HEART RATE: 50 BPM

## 2019-12-02 VITALS
DIASTOLIC BLOOD PRESSURE: 59 MMHG | TEMPERATURE: 97.6 F | OXYGEN SATURATION: 92 % | RESPIRATION RATE: 14 BRPM | SYSTOLIC BLOOD PRESSURE: 136 MMHG | HEART RATE: 51 BPM

## 2019-12-02 PROCEDURE — 3331090002 HH PPS REVENUE DEBIT

## 2019-12-02 PROCEDURE — G0495 RN CARE TRAIN/EDU IN HH: HCPCS

## 2019-12-02 PROCEDURE — 3331090001 HH PPS REVENUE CREDIT

## 2019-12-02 PROCEDURE — G0151 HHCP-SERV OF PT,EA 15 MIN: HCPCS

## 2019-12-02 PROCEDURE — G0152 HHCP-SERV OF OT,EA 15 MIN: HCPCS

## 2019-12-03 PROCEDURE — 3331090001 HH PPS REVENUE CREDIT

## 2019-12-03 PROCEDURE — 3331090002 HH PPS REVENUE DEBIT

## 2019-12-04 PROCEDURE — 3331090001 HH PPS REVENUE CREDIT

## 2019-12-04 PROCEDURE — 3331090002 HH PPS REVENUE DEBIT

## 2019-12-05 ENCOUNTER — HOME CARE VISIT (OUTPATIENT)
Dept: SCHEDULING | Facility: HOME HEALTH | Age: 84
End: 2019-12-05
Payer: MEDICARE

## 2019-12-05 VITALS
SYSTOLIC BLOOD PRESSURE: 132 MMHG | OXYGEN SATURATION: 95 % | TEMPERATURE: 96.5 F | RESPIRATION RATE: 14 BRPM | HEART RATE: 59 BPM | DIASTOLIC BLOOD PRESSURE: 51 MMHG

## 2019-12-05 PROCEDURE — 3331090002 HH PPS REVENUE DEBIT

## 2019-12-05 PROCEDURE — 3331090001 HH PPS REVENUE CREDIT

## 2019-12-05 PROCEDURE — G0495 RN CARE TRAIN/EDU IN HH: HCPCS

## 2019-12-05 PROCEDURE — G0157 HHC PT ASSISTANT EA 15: HCPCS

## 2019-12-06 ENCOUNTER — HOME CARE VISIT (OUTPATIENT)
Dept: SCHEDULING | Facility: HOME HEALTH | Age: 84
End: 2019-12-06
Payer: MEDICARE

## 2019-12-06 ENCOUNTER — HOME CARE VISIT (OUTPATIENT)
Dept: HOME HEALTH SERVICES | Facility: HOME HEALTH | Age: 84
End: 2019-12-06
Payer: MEDICARE

## 2019-12-06 PROCEDURE — 3331090002 HH PPS REVENUE DEBIT

## 2019-12-06 PROCEDURE — G0152 HHCP-SERV OF OT,EA 15 MIN: HCPCS

## 2019-12-06 PROCEDURE — 3331090001 HH PPS REVENUE CREDIT

## 2019-12-07 PROCEDURE — 3331090002 HH PPS REVENUE DEBIT

## 2019-12-07 PROCEDURE — 3331090001 HH PPS REVENUE CREDIT

## 2019-12-08 VITALS
SYSTOLIC BLOOD PRESSURE: 132 MMHG | DIASTOLIC BLOOD PRESSURE: 65 MMHG | TEMPERATURE: 97.9 F | OXYGEN SATURATION: 98 % | HEART RATE: 84 BPM

## 2019-12-08 PROCEDURE — 3331090002 HH PPS REVENUE DEBIT

## 2019-12-08 PROCEDURE — 3331090001 HH PPS REVENUE CREDIT

## 2019-12-09 ENCOUNTER — HOME CARE VISIT (OUTPATIENT)
Dept: SCHEDULING | Facility: HOME HEALTH | Age: 84
End: 2019-12-09
Payer: MEDICARE

## 2019-12-09 VITALS
SYSTOLIC BLOOD PRESSURE: 121 MMHG | OXYGEN SATURATION: 97 % | HEART RATE: 52 BPM | DIASTOLIC BLOOD PRESSURE: 52 MMHG | TEMPERATURE: 98.3 F

## 2019-12-09 PROCEDURE — G0152 HHCP-SERV OF OT,EA 15 MIN: HCPCS

## 2019-12-09 PROCEDURE — 3331090002 HH PPS REVENUE DEBIT

## 2019-12-09 PROCEDURE — 3331090001 HH PPS REVENUE CREDIT

## 2019-12-10 ENCOUNTER — HOME CARE VISIT (OUTPATIENT)
Dept: SCHEDULING | Facility: HOME HEALTH | Age: 84
End: 2019-12-10
Payer: MEDICARE

## 2019-12-10 VITALS
HEART RATE: 54 BPM | SYSTOLIC BLOOD PRESSURE: 111 MMHG | OXYGEN SATURATION: 97 % | RESPIRATION RATE: 14 BRPM | DIASTOLIC BLOOD PRESSURE: 42 MMHG | TEMPERATURE: 97.6 F

## 2019-12-10 VITALS
OXYGEN SATURATION: 96 % | DIASTOLIC BLOOD PRESSURE: 72 MMHG | HEART RATE: 49 BPM | SYSTOLIC BLOOD PRESSURE: 115 MMHG | TEMPERATURE: 97.9 F

## 2019-12-10 PROCEDURE — 3331090002 HH PPS REVENUE DEBIT

## 2019-12-10 PROCEDURE — G0157 HHC PT ASSISTANT EA 15: HCPCS

## 2019-12-10 PROCEDURE — 3331090001 HH PPS REVENUE CREDIT

## 2019-12-10 PROCEDURE — G0495 RN CARE TRAIN/EDU IN HH: HCPCS

## 2019-12-11 PROCEDURE — 3331090002 HH PPS REVENUE DEBIT

## 2019-12-11 PROCEDURE — 3331090001 HH PPS REVENUE CREDIT

## 2019-12-12 ENCOUNTER — HOME CARE VISIT (OUTPATIENT)
Dept: SCHEDULING | Facility: HOME HEALTH | Age: 84
End: 2019-12-12
Payer: MEDICARE

## 2019-12-12 PROCEDURE — G0157 HHC PT ASSISTANT EA 15: HCPCS

## 2019-12-12 PROCEDURE — 3331090002 HH PPS REVENUE DEBIT

## 2019-12-12 PROCEDURE — 3331090001 HH PPS REVENUE CREDIT

## 2019-12-13 ENCOUNTER — HOME CARE VISIT (OUTPATIENT)
Dept: SCHEDULING | Facility: HOME HEALTH | Age: 84
End: 2019-12-13
Payer: MEDICARE

## 2019-12-13 VITALS
OXYGEN SATURATION: 97 % | SYSTOLIC BLOOD PRESSURE: 99 MMHG | TEMPERATURE: 96.5 F | DIASTOLIC BLOOD PRESSURE: 56 MMHG | RESPIRATION RATE: 14 BRPM | HEART RATE: 56 BPM

## 2019-12-13 VITALS — DIASTOLIC BLOOD PRESSURE: 68 MMHG | OXYGEN SATURATION: 96 % | SYSTOLIC BLOOD PRESSURE: 98 MMHG

## 2019-12-13 PROCEDURE — 3331090001 HH PPS REVENUE CREDIT

## 2019-12-13 PROCEDURE — G0152 HHCP-SERV OF OT,EA 15 MIN: HCPCS

## 2019-12-13 PROCEDURE — 3331090002 HH PPS REVENUE DEBIT

## 2019-12-13 PROCEDURE — G0495 RN CARE TRAIN/EDU IN HH: HCPCS

## 2019-12-14 PROCEDURE — 3331090001 HH PPS REVENUE CREDIT

## 2019-12-14 PROCEDURE — 3331090002 HH PPS REVENUE DEBIT

## 2019-12-15 PROCEDURE — 3331090002 HH PPS REVENUE DEBIT

## 2019-12-15 PROCEDURE — 3331090001 HH PPS REVENUE CREDIT

## 2019-12-16 ENCOUNTER — HOME CARE VISIT (OUTPATIENT)
Dept: SCHEDULING | Facility: HOME HEALTH | Age: 84
End: 2019-12-16
Payer: MEDICARE

## 2019-12-16 VITALS
DIASTOLIC BLOOD PRESSURE: 42 MMHG | SYSTOLIC BLOOD PRESSURE: 120 MMHG | OXYGEN SATURATION: 96 % | TEMPERATURE: 98 F | HEART RATE: 50 BPM | OXYGEN SATURATION: 97 % | TEMPERATURE: 97.6 F | HEART RATE: 54 BPM | DIASTOLIC BLOOD PRESSURE: 55 MMHG | SYSTOLIC BLOOD PRESSURE: 111 MMHG

## 2019-12-16 VITALS
OXYGEN SATURATION: 95 % | HEART RATE: 54 BPM | RESPIRATION RATE: 14 BRPM | DIASTOLIC BLOOD PRESSURE: 45 MMHG | TEMPERATURE: 97.1 F | SYSTOLIC BLOOD PRESSURE: 110 MMHG

## 2019-12-16 PROCEDURE — 3331090002 HH PPS REVENUE DEBIT

## 2019-12-16 PROCEDURE — 3331090001 HH PPS REVENUE CREDIT

## 2019-12-16 PROCEDURE — G0495 RN CARE TRAIN/EDU IN HH: HCPCS

## 2019-12-17 ENCOUNTER — HOME CARE VISIT (OUTPATIENT)
Dept: SCHEDULING | Facility: HOME HEALTH | Age: 84
End: 2019-12-17
Payer: MEDICARE

## 2019-12-17 VITALS
OXYGEN SATURATION: 93 % | HEART RATE: 54 BPM | TEMPERATURE: 99.2 F | SYSTOLIC BLOOD PRESSURE: 126 MMHG | DIASTOLIC BLOOD PRESSURE: 56 MMHG

## 2019-12-17 PROCEDURE — G0157 HHC PT ASSISTANT EA 15: HCPCS

## 2019-12-17 PROCEDURE — 3331090001 HH PPS REVENUE CREDIT

## 2019-12-17 PROCEDURE — 3331090002 HH PPS REVENUE DEBIT

## 2019-12-18 PROCEDURE — 3331090002 HH PPS REVENUE DEBIT

## 2019-12-18 PROCEDURE — 3331090001 HH PPS REVENUE CREDIT

## 2019-12-19 ENCOUNTER — HOME CARE VISIT (OUTPATIENT)
Dept: SCHEDULING | Facility: HOME HEALTH | Age: 84
End: 2019-12-19
Payer: MEDICARE

## 2019-12-19 VITALS
TEMPERATURE: 99.1 F | DIASTOLIC BLOOD PRESSURE: 47 MMHG | OXYGEN SATURATION: 97 % | HEART RATE: 54 BPM | SYSTOLIC BLOOD PRESSURE: 101 MMHG

## 2019-12-19 PROCEDURE — 3331090002 HH PPS REVENUE DEBIT

## 2019-12-19 PROCEDURE — 3331090001 HH PPS REVENUE CREDIT

## 2019-12-19 PROCEDURE — G0157 HHC PT ASSISTANT EA 15: HCPCS

## 2019-12-20 ENCOUNTER — HOME CARE VISIT (OUTPATIENT)
Dept: SCHEDULING | Facility: HOME HEALTH | Age: 84
End: 2019-12-20
Payer: MEDICARE

## 2019-12-20 PROCEDURE — G0495 RN CARE TRAIN/EDU IN HH: HCPCS

## 2019-12-20 PROCEDURE — 3331090001 HH PPS REVENUE CREDIT

## 2019-12-20 PROCEDURE — 3331090002 HH PPS REVENUE DEBIT

## 2019-12-21 VITALS
RESPIRATION RATE: 14 BRPM | SYSTOLIC BLOOD PRESSURE: 112 MMHG | HEART RATE: 47 BPM | DIASTOLIC BLOOD PRESSURE: 51 MMHG | TEMPERATURE: 97.8 F | OXYGEN SATURATION: 95 %

## 2019-12-21 PROCEDURE — 3331090002 HH PPS REVENUE DEBIT

## 2019-12-21 PROCEDURE — 3331090001 HH PPS REVENUE CREDIT

## 2019-12-22 PROCEDURE — 3331090002 HH PPS REVENUE DEBIT

## 2019-12-22 PROCEDURE — 3331090001 HH PPS REVENUE CREDIT

## 2019-12-23 ENCOUNTER — HOME CARE VISIT (OUTPATIENT)
Dept: SCHEDULING | Facility: HOME HEALTH | Age: 84
End: 2019-12-23
Payer: MEDICARE

## 2019-12-23 VITALS
OXYGEN SATURATION: 96 % | HEART RATE: 51 BPM | SYSTOLIC BLOOD PRESSURE: 128 MMHG | TEMPERATURE: 97.5 F | DIASTOLIC BLOOD PRESSURE: 56 MMHG

## 2019-12-23 PROCEDURE — G0151 HHCP-SERV OF PT,EA 15 MIN: HCPCS

## 2019-12-23 PROCEDURE — 3331090002 HH PPS REVENUE DEBIT

## 2019-12-23 PROCEDURE — 3331090001 HH PPS REVENUE CREDIT

## 2019-12-24 PROCEDURE — 3331090002 HH PPS REVENUE DEBIT

## 2019-12-24 PROCEDURE — 3331090001 HH PPS REVENUE CREDIT

## 2019-12-25 PROCEDURE — 3331090001 HH PPS REVENUE CREDIT

## 2019-12-25 PROCEDURE — 3331090002 HH PPS REVENUE DEBIT

## 2019-12-26 ENCOUNTER — HOME CARE VISIT (OUTPATIENT)
Dept: HOME HEALTH SERVICES | Facility: HOME HEALTH | Age: 84
End: 2019-12-26
Payer: MEDICARE

## 2019-12-26 PROCEDURE — 3331090002 HH PPS REVENUE DEBIT

## 2019-12-26 PROCEDURE — 3331090001 HH PPS REVENUE CREDIT

## 2019-12-27 ENCOUNTER — HOSPITAL ENCOUNTER (OUTPATIENT)
Dept: GENERAL RADIOLOGY | Age: 84
Discharge: HOME | End: 2019-12-27
Payer: MEDICARE

## 2019-12-27 DIAGNOSIS — J18.9 UNRESOLVED PNEUMONIA: ICD-10-CM

## 2019-12-27 PROCEDURE — 3331090001 HH PPS REVENUE CREDIT

## 2019-12-27 PROCEDURE — 71046 X-RAY EXAM CHEST 2 VIEWS: CPT

## 2019-12-27 PROCEDURE — 3331090002 HH PPS REVENUE DEBIT

## 2019-12-28 PROCEDURE — 3331090002 HH PPS REVENUE DEBIT

## 2019-12-28 PROCEDURE — 3331090001 HH PPS REVENUE CREDIT

## 2019-12-29 PROCEDURE — 3331090002 HH PPS REVENUE DEBIT

## 2019-12-29 PROCEDURE — 3331090001 HH PPS REVENUE CREDIT

## 2019-12-30 PROCEDURE — 3331090002 HH PPS REVENUE DEBIT

## 2019-12-30 PROCEDURE — 3331090001 HH PPS REVENUE CREDIT

## 2019-12-31 PROCEDURE — 3331090001 HH PPS REVENUE CREDIT

## 2019-12-31 PROCEDURE — 3331090002 HH PPS REVENUE DEBIT

## 2020-01-01 PROCEDURE — 3331090001 HH PPS REVENUE CREDIT

## 2020-01-01 PROCEDURE — 3331090002 HH PPS REVENUE DEBIT

## 2020-01-02 ENCOUNTER — HOME CARE VISIT (OUTPATIENT)
Dept: SCHEDULING | Facility: HOME HEALTH | Age: 85
End: 2020-01-02
Payer: MEDICARE

## 2020-01-02 VITALS
HEART RATE: 55 BPM | RESPIRATION RATE: 16 BRPM | TEMPERATURE: 98.6 F | SYSTOLIC BLOOD PRESSURE: 124 MMHG | OXYGEN SATURATION: 98 % | DIASTOLIC BLOOD PRESSURE: 60 MMHG

## 2020-01-02 PROCEDURE — 3331090001 HH PPS REVENUE CREDIT

## 2020-01-02 PROCEDURE — 3331090002 HH PPS REVENUE DEBIT

## 2020-01-02 PROCEDURE — G0299 HHS/HOSPICE OF RN EA 15 MIN: HCPCS

## 2020-01-03 PROCEDURE — 3331090002 HH PPS REVENUE DEBIT

## 2020-01-03 PROCEDURE — 3331090001 HH PPS REVENUE CREDIT

## 2020-01-04 PROCEDURE — 3331090002 HH PPS REVENUE DEBIT

## 2020-01-04 PROCEDURE — 3331090001 HH PPS REVENUE CREDIT

## 2020-01-05 PROCEDURE — 3331090001 HH PPS REVENUE CREDIT

## 2020-01-05 PROCEDURE — 3331090002 HH PPS REVENUE DEBIT

## 2020-01-06 PROCEDURE — 3331090001 HH PPS REVENUE CREDIT

## 2020-01-06 PROCEDURE — 3331090002 HH PPS REVENUE DEBIT

## 2020-01-07 PROCEDURE — 3331090002 HH PPS REVENUE DEBIT

## 2020-01-07 PROCEDURE — 3331090001 HH PPS REVENUE CREDIT

## 2020-01-08 PROCEDURE — 3331090002 HH PPS REVENUE DEBIT

## 2020-01-08 PROCEDURE — 3331090001 HH PPS REVENUE CREDIT

## 2020-01-09 ENCOUNTER — HOME CARE VISIT (OUTPATIENT)
Dept: SCHEDULING | Facility: HOME HEALTH | Age: 85
End: 2020-01-09
Payer: MEDICARE

## 2020-01-09 VITALS
SYSTOLIC BLOOD PRESSURE: 114 MMHG | TEMPERATURE: 97.4 F | RESPIRATION RATE: 14 BRPM | DIASTOLIC BLOOD PRESSURE: 47 MMHG | OXYGEN SATURATION: 96 % | HEART RATE: 52 BPM

## 2020-01-09 PROCEDURE — 3331090002 HH PPS REVENUE DEBIT

## 2020-01-09 PROCEDURE — 3331090001 HH PPS REVENUE CREDIT

## 2020-01-09 PROCEDURE — G0495 RN CARE TRAIN/EDU IN HH: HCPCS

## 2020-01-10 PROCEDURE — 3331090001 HH PPS REVENUE CREDIT

## 2020-01-10 PROCEDURE — 3331090002 HH PPS REVENUE DEBIT

## 2020-01-11 PROCEDURE — 3331090001 HH PPS REVENUE CREDIT

## 2020-01-11 PROCEDURE — 3331090002 HH PPS REVENUE DEBIT

## 2020-01-12 PROCEDURE — 3331090001 HH PPS REVENUE CREDIT

## 2020-01-12 PROCEDURE — 3331090002 HH PPS REVENUE DEBIT

## 2020-01-13 PROCEDURE — 3331090001 HH PPS REVENUE CREDIT

## 2020-01-13 PROCEDURE — 3331090002 HH PPS REVENUE DEBIT

## 2020-01-14 PROCEDURE — 3331090001 HH PPS REVENUE CREDIT

## 2020-01-14 PROCEDURE — 3331090002 HH PPS REVENUE DEBIT

## 2020-01-15 ENCOUNTER — HOME CARE VISIT (OUTPATIENT)
Dept: SCHEDULING | Facility: HOME HEALTH | Age: 85
End: 2020-01-15
Payer: MEDICARE

## 2020-01-15 VITALS
TEMPERATURE: 97.9 F | HEART RATE: 47 BPM | SYSTOLIC BLOOD PRESSURE: 133 MMHG | OXYGEN SATURATION: 97 % | DIASTOLIC BLOOD PRESSURE: 52 MMHG | RESPIRATION RATE: 16 BRPM

## 2020-01-15 PROCEDURE — 3331090002 HH PPS REVENUE DEBIT

## 2020-01-15 PROCEDURE — G0495 RN CARE TRAIN/EDU IN HH: HCPCS

## 2020-01-15 PROCEDURE — 3331090001 HH PPS REVENUE CREDIT

## 2020-01-16 PROCEDURE — 3331090001 HH PPS REVENUE CREDIT

## 2020-01-16 PROCEDURE — 3331090002 HH PPS REVENUE DEBIT

## 2020-01-17 PROCEDURE — 3331090001 HH PPS REVENUE CREDIT

## 2020-01-17 PROCEDURE — 3331090002 HH PPS REVENUE DEBIT

## 2020-01-18 PROCEDURE — 3331090002 HH PPS REVENUE DEBIT

## 2020-01-18 PROCEDURE — 3331090001 HH PPS REVENUE CREDIT

## 2020-01-19 PROCEDURE — 3331090002 HH PPS REVENUE DEBIT

## 2020-01-19 PROCEDURE — 3331090001 HH PPS REVENUE CREDIT

## 2020-01-20 PROCEDURE — 3331090001 HH PPS REVENUE CREDIT

## 2020-01-20 PROCEDURE — 3331090002 HH PPS REVENUE DEBIT

## 2020-01-21 PROCEDURE — 3331090002 HH PPS REVENUE DEBIT

## 2020-01-21 PROCEDURE — 3331090001 HH PPS REVENUE CREDIT

## 2020-01-22 ENCOUNTER — HOME CARE VISIT (OUTPATIENT)
Dept: SCHEDULING | Facility: HOME HEALTH | Age: 85
End: 2020-01-22
Payer: MEDICARE

## 2020-01-22 VITALS
SYSTOLIC BLOOD PRESSURE: 128 MMHG | TEMPERATURE: 97.2 F | OXYGEN SATURATION: 97 % | DIASTOLIC BLOOD PRESSURE: 53 MMHG | HEART RATE: 49 BPM | RESPIRATION RATE: 18 BRPM

## 2020-01-22 PROCEDURE — 3331090002 HH PPS REVENUE DEBIT

## 2020-01-22 PROCEDURE — G0495 RN CARE TRAIN/EDU IN HH: HCPCS

## 2020-01-22 PROCEDURE — 3331090001 HH PPS REVENUE CREDIT

## 2020-01-23 PROCEDURE — 3331090002 HH PPS REVENUE DEBIT

## 2020-01-23 PROCEDURE — 3331090001 HH PPS REVENUE CREDIT

## 2020-01-24 PROCEDURE — 3331090002 HH PPS REVENUE DEBIT

## 2020-01-24 PROCEDURE — 3331090001 HH PPS REVENUE CREDIT

## 2020-01-25 PROCEDURE — 3331090002 HH PPS REVENUE DEBIT

## 2020-01-25 PROCEDURE — 3331090001 HH PPS REVENUE CREDIT

## 2020-01-26 PROCEDURE — 3331090002 HH PPS REVENUE DEBIT

## 2020-01-26 PROCEDURE — 3331090001 HH PPS REVENUE CREDIT

## 2020-01-27 ENCOUNTER — HOME CARE VISIT (OUTPATIENT)
Dept: SCHEDULING | Facility: HOME HEALTH | Age: 85
End: 2020-01-27
Payer: MEDICARE

## 2020-01-27 VITALS
SYSTOLIC BLOOD PRESSURE: 121 MMHG | OXYGEN SATURATION: 97 % | DIASTOLIC BLOOD PRESSURE: 50 MMHG | HEART RATE: 43 BPM | TEMPERATURE: 96.5 F | RESPIRATION RATE: 16 BRPM

## 2020-01-27 PROCEDURE — 3331090002 HH PPS REVENUE DEBIT

## 2020-01-27 PROCEDURE — 3331090003 HH PPS REVENUE ADJ

## 2020-01-27 PROCEDURE — 3331090001 HH PPS REVENUE CREDIT

## 2020-01-27 PROCEDURE — G0495 RN CARE TRAIN/EDU IN HH: HCPCS

## 2020-02-14 ENCOUNTER — HOSPITAL ENCOUNTER (OUTPATIENT)
Dept: GENERAL RADIOLOGY | Age: 85
Discharge: HOME | End: 2020-02-14
Attending: INTERNAL MEDICINE
Payer: MEDICARE

## 2020-02-14 ENCOUNTER — HOSPITAL ENCOUNTER (OUTPATIENT)
Dept: LAB | Age: 85
Discharge: HOME | End: 2020-02-14
Payer: MEDICARE

## 2020-02-14 DIAGNOSIS — I10 ESSENTIAL HYPERTENSION, MALIGNANT: ICD-10-CM

## 2020-02-14 DIAGNOSIS — N18.30 CHRONIC KIDNEY DISEASE, STAGE III (MODERATE) (HCC): ICD-10-CM

## 2020-02-14 DIAGNOSIS — R91.8 LUNG MASS: ICD-10-CM

## 2020-02-14 LAB
ANION GAP SERPL CALC-SCNC: 4 MMOL/L (ref 3–18)
BUN SERPL-MCNC: 43 MG/DL (ref 7–18)
BUN/CREAT SERPL: 26 (ref 12–20)
CALCIUM SERPL-MCNC: 9.7 MG/DL (ref 8.5–10.1)
CHLORIDE SERPL-SCNC: 103 MMOL/L (ref 100–111)
CO2 SERPL-SCNC: 32 MMOL/L (ref 21–32)
CREAT SERPL-MCNC: 1.68 MG/DL (ref 0.6–1.3)
GLUCOSE SERPL-MCNC: 94 MG/DL (ref 74–99)
POTASSIUM SERPL-SCNC: 4.8 MMOL/L (ref 3.5–5.5)
SODIUM SERPL-SCNC: 139 MMOL/L (ref 136–145)

## 2020-02-14 PROCEDURE — 71046 X-RAY EXAM CHEST 2 VIEWS: CPT

## 2020-02-14 PROCEDURE — 80048 BASIC METABOLIC PNL TOTAL CA: CPT

## 2020-02-14 PROCEDURE — 36415 COLL VENOUS BLD VENIPUNCTURE: CPT

## 2022-02-22 ENCOUNTER — APPOINTMENT (OUTPATIENT)
Dept: CT IMAGING | Age: 87
End: 2022-02-22
Attending: EMERGENCY MEDICINE
Payer: MEDICARE

## 2022-02-22 ENCOUNTER — APPOINTMENT (OUTPATIENT)
Dept: GENERAL RADIOLOGY | Age: 87
End: 2022-02-22
Attending: EMERGENCY MEDICINE
Payer: MEDICARE

## 2022-02-22 ENCOUNTER — HOSPITAL ENCOUNTER (EMERGENCY)
Age: 87
Discharge: HOME | End: 2022-02-22
Attending: EMERGENCY MEDICINE
Payer: MEDICARE

## 2022-02-22 VITALS
DIASTOLIC BLOOD PRESSURE: 58 MMHG | SYSTOLIC BLOOD PRESSURE: 157 MMHG | BODY MASS INDEX: 31.89 KG/M2 | WEIGHT: 180 LBS | HEART RATE: 52 BPM | TEMPERATURE: 97.8 F | RESPIRATION RATE: 16 BRPM | OXYGEN SATURATION: 93 %

## 2022-02-22 DIAGNOSIS — R55 VASOVAGAL SYNCOPE: Primary | ICD-10-CM

## 2022-02-22 DIAGNOSIS — K59.00 CONSTIPATION, UNSPECIFIED CONSTIPATION TYPE: ICD-10-CM

## 2022-02-22 DIAGNOSIS — S22.080A COMPRESSION FRACTURE OF T12 VERTEBRA, INITIAL ENCOUNTER (HCC): ICD-10-CM

## 2022-02-22 LAB
ALBUMIN SERPL-MCNC: 3.4 G/DL (ref 3.4–5)
ALBUMIN/GLOB SERPL: 1.1 {RATIO} (ref 0.8–1.7)
ALP SERPL-CCNC: 100 U/L (ref 45–117)
ALT SERPL-CCNC: 17 U/L (ref 13–56)
ANION GAP SERPL CALC-SCNC: 6 MMOL/L (ref 3–18)
APPEARANCE UR: CLEAR
AST SERPL-CCNC: 20 U/L (ref 10–38)
ATRIAL RATE: 51 BPM
ATRIAL RATE: 52 BPM
BACTERIA URNS QL MICRO: NEGATIVE /HPF
BASOPHILS # BLD: 0.1 K/UL (ref 0–0.1)
BASOPHILS NFR BLD: 1 % (ref 0–2)
BILIRUB SERPL-MCNC: 1.1 MG/DL (ref 0.2–1)
BILIRUB UR QL: NEGATIVE
BUN SERPL-MCNC: 35 MG/DL (ref 7–18)
BUN/CREAT SERPL: 19 (ref 12–20)
CALCIUM SERPL-MCNC: 9.4 MG/DL (ref 8.5–10.1)
CALCULATED P AXIS, ECG09: 126 DEGREES
CALCULATED P AXIS, ECG09: 71 DEGREES
CALCULATED R AXIS, ECG10: 39 DEGREES
CALCULATED R AXIS, ECG10: 48 DEGREES
CALCULATED T AXIS, ECG11: 50 DEGREES
CALCULATED T AXIS, ECG11: 90 DEGREES
CHLORIDE SERPL-SCNC: 105 MMOL/L (ref 100–111)
CO2 SERPL-SCNC: 27 MMOL/L (ref 21–32)
COLOR UR: YELLOW
CREAT SERPL-MCNC: 1.81 MG/DL (ref 0.6–1.3)
DIAGNOSIS, 93000: NORMAL
DIAGNOSIS, 93000: NORMAL
DIFFERENTIAL METHOD BLD: ABNORMAL
EOSINOPHIL # BLD: 0.3 K/UL (ref 0–0.4)
EOSINOPHIL NFR BLD: 3 % (ref 0–5)
EPITH CASTS URNS QL MICRO: NORMAL /LPF (ref 0–5)
ERYTHROCYTE [DISTWIDTH] IN BLOOD BY AUTOMATED COUNT: 13.3 % (ref 11.6–14.5)
GLOBULIN SER CALC-MCNC: 3 G/DL (ref 2–4)
GLUCOSE SERPL-MCNC: 142 MG/DL (ref 74–99)
GLUCOSE UR STRIP.AUTO-MCNC: NEGATIVE MG/DL
HCT VFR BLD AUTO: 35.4 % (ref 35–45)
HGB BLD-MCNC: 10.9 G/DL (ref 12–16)
HGB UR QL STRIP: NEGATIVE
IMM GRANULOCYTES # BLD AUTO: 0.1 K/UL (ref 0–0.04)
IMM GRANULOCYTES NFR BLD AUTO: 1 % (ref 0–0.5)
KETONES UR QL STRIP.AUTO: NEGATIVE MG/DL
LEUKOCYTE ESTERASE UR QL STRIP.AUTO: NEGATIVE
LIPASE SERPL-CCNC: 73 U/L (ref 73–393)
LYMPHOCYTES # BLD: 1.2 K/UL (ref 0.9–3.6)
LYMPHOCYTES NFR BLD: 9 % (ref 21–52)
MCH RBC QN AUTO: 30.8 PG (ref 24–34)
MCHC RBC AUTO-ENTMCNC: 30.8 G/DL (ref 31–37)
MCV RBC AUTO: 100 FL (ref 78–100)
MONOCYTES # BLD: 0.5 K/UL (ref 0.05–1.2)
MONOCYTES NFR BLD: 3 % (ref 3–10)
NEUTS SEG # BLD: 11.5 K/UL (ref 1.8–8)
NEUTS SEG NFR BLD: 84 % (ref 40–73)
NITRITE UR QL STRIP.AUTO: NEGATIVE
NRBC # BLD: 0 K/UL (ref 0–0.01)
NRBC BLD-RTO: 0 PER 100 WBC
P-R INTERVAL, ECG05: 250 MS
P-R INTERVAL, ECG05: 280 MS
PH UR STRIP: 7 [PH] (ref 5–8)
PLATELET # BLD AUTO: 248 K/UL (ref 135–420)
PMV BLD AUTO: 9.6 FL (ref 9.2–11.8)
POTASSIUM SERPL-SCNC: 4.5 MMOL/L (ref 3.5–5.5)
PROT SERPL-MCNC: 6.4 G/DL (ref 6.4–8.2)
PROT UR STRIP-MCNC: ABNORMAL MG/DL
Q-T INTERVAL, ECG07: 406 MS
Q-T INTERVAL, ECG07: 450 MS
QRS DURATION, ECG06: 110 MS
QRS DURATION, ECG06: 86 MS
QTC CALCULATION (BEZET), ECG08: 374 MS
QTC CALCULATION (BEZET), ECG08: 418 MS
RBC # BLD AUTO: 3.54 M/UL (ref 4.2–5.3)
RBC #/AREA URNS HPF: NEGATIVE /HPF (ref 0–5)
SODIUM SERPL-SCNC: 138 MMOL/L (ref 136–145)
SP GR UR REFRACTOMETRY: 1.01 (ref 1–1.03)
TROPONIN-HIGH SENSITIVITY: 12 NG/L (ref 0–54)
TROPONIN-HIGH SENSITIVITY: 14 NG/L (ref 0–54)
UROBILINOGEN UR QL STRIP.AUTO: 0.2 EU/DL (ref 0.2–1)
VENTRICULAR RATE, ECG03: 51 BPM
VENTRICULAR RATE, ECG03: 52 BPM
WBC # BLD AUTO: 13.7 K/UL (ref 4.6–13.2)
WBC URNS QL MICRO: NORMAL /HPF (ref 0–5)

## 2022-02-22 PROCEDURE — 71045 X-RAY EXAM CHEST 1 VIEW: CPT

## 2022-02-22 PROCEDURE — 81001 URINALYSIS AUTO W/SCOPE: CPT

## 2022-02-22 PROCEDURE — 93005 ELECTROCARDIOGRAM TRACING: CPT

## 2022-02-22 PROCEDURE — 99284 EMERGENCY DEPT VISIT MOD MDM: CPT

## 2022-02-22 PROCEDURE — 84484 ASSAY OF TROPONIN QUANT: CPT

## 2022-02-22 PROCEDURE — 83690 ASSAY OF LIPASE: CPT

## 2022-02-22 PROCEDURE — 85025 COMPLETE CBC W/AUTO DIFF WBC: CPT

## 2022-02-22 PROCEDURE — 70450 CT HEAD/BRAIN W/O DYE: CPT

## 2022-02-22 PROCEDURE — 74176 CT ABD & PELVIS W/O CONTRAST: CPT

## 2022-02-22 PROCEDURE — 80053 COMPREHEN METABOLIC PANEL: CPT

## 2022-02-22 NOTE — ED NOTES
I have reviewed discharge instructions with the patient. The patient verbalized understanding. Patient armband removed and shredded    Pt escorted to car.

## 2022-02-22 NOTE — ED PROVIDER NOTES
EMERGENCY DEPARTMENT HISTORY AND PHYSICAL EXAM    Date: 2/22/2022  Patient Name: Matilde Stovall    History of Presenting Illness     Chief Complaint   Patient presents with    Syncope         History Provided By: Patient and EMS    10:38 AM  Matilde Stovall is a 80 y.o. female with PMHX of hypertension, hyperlipidemia, osteoporosis, CKD, prior appendectomy and cholecystectomy who presents to the emergency department C/O abdominal pain. Patient reports she started having pain in her lower abdomen today. EMS reports they were called to patient's residence by her daughter for low blood pressure and fainting. Patient's daughter told EMS that these episodes of been happening over the past few weeks typically in the morning after patient takes her blood pressure medications. Today patient was trying to get from her bed to the bedside commode when she had a fainting episode. EMS reports on their arrival patient was alert and on the toilet but when you stand her up she becomes very lightheaded and nearly passes out. Patient states she feels like she has \"gas\" in her lower abdomen but is unable to have a bowel movement today. Last bowel movement yesterday. Denies any urinary symptoms, chest pain, headache, shortness of breath, cough, other complaints. PCP: Live Estes MD    Current Facility-Administered Medications   Medication Dose Route Frequency Provider Last Rate Last Admin    sodium chloride 0.9 % bolus infusion 500 mL  500 mL IntraVENous ONCE Keli Walters MD         Current Outpatient Medications   Medication Sig Dispense Refill    acidophilus-pectin, citrus (ACIDOPHILUS PROBIOTIC) 100 million cell-10 mg cap Take 1 Tab by mouth daily.  Lactobac 40-Bifido 3-S.thermop (PROBIOTIC) 100 billion cell cap Take 1 Cap by mouth daily.  acetaminophen (TYLENOL) 500 mg tablet Take 500 mg by mouth every six (6) hours as needed for Pain.       ergocalciferol (VITAMIN D2) 50,000 unit capsule Take 50,000 Units by mouth every seven (7) days.  fluticasone propionate (FLONASE) 50 mcg/actuation nasal spray 2 Sprays by Both Nostrils route daily.  amLODIPine (NORVASC) 10 mg tablet Take 1 Tab by mouth daily. 30 Tab 0    carvedilol (COREG) 12.5 mg tablet Take 1 Tab by mouth two (2) times daily (with meals). 60 Tab 0    furosemide (LASIX) 20 mg tablet Take 1 Tab by mouth daily. 30 Tab 0    lisinopril (PRINIVIL, ZESTRIL) 10 mg tablet Take 1 Tab by mouth daily. 30 Tab 0    calcium-cholecalciferol, D3, (CALTRATE 600+D) tablet Take 1 Tab by mouth two (2) times a day.  levothyroxine (SYNTHROID) 100 mcg tablet Take 100 mcg by mouth Daily (before breakfast).  gabapentin (NEURONTIN) 100 mg capsule Take 200 mg by mouth three (3) times daily.  loratadine (CLARITIN) 10 mg tablet Take 10 mg by mouth daily.  famotidine (PEPCID) 20 mg tablet Take 40 mg by mouth two (2) times a day. Past History       Past Medical History:  Past Medical History:   Diagnosis Date    Adult hypothyroidism     Arthritis     Cancer ([facility] [address]     Hypertension     Osteoporosis, idiopathic     Sciatica     Spinal stenosis     Squamous cell cancer of skin of forearm, right        Past Surgical History:  Past Surgical History:   Procedure Laterality Date    HX APPENDECTOMY      HX CATARACT REMOVAL      HX CHOLECYSTECTOMY      HX DILATION AND CURETTAGE      HX KNEE ARTHROSCOPY      HX MOHS PROCEDURES      HX PARTIAL THYROIDECTOMY      HX REFRACTIVE SURGERY         Family History:  No family history on file. Social History:  Social History     Tobacco Use    Smoking status: Never Smoker    Smokeless tobacco: Never Used   Substance Use Topics    Alcohol use: Never    Drug use: Never       Allergies: Allergies   Allergen Reactions    Septra [Sulfamethoprim] Rash         Review of Systems   Review of Systems   Constitutional: Negative for fever. Respiratory: Negative for shortness of breath. Cardiovascular: Negative for chest pain. Gastrointestinal: Positive for abdominal pain. Neurological: Positive for syncope and light-headedness. All other systems reviewed and are negative.         Physical Exam     Vitals:    02/22/22 1108 02/22/22 1118 02/22/22 1133 02/22/22 1318   BP:    (!) 157/58   Pulse:       Resp:       Temp:       SpO2: 96% 95% 94% 93%   Weight:         Physical Exam    Nursing notes and vital signs reviewed    Constitutional: Non toxic appearing, moderate distress  Head: Normocephalic, Atraumatic  Eyes: EOMI  Neck: Supple  Cardiovascular: Regular rate and rhythm, no murmurs, rubs, or gallops  Chest: Normal work of breathing and chest excursion bilaterally  Lungs: Clear to ausculation bilaterally  Abdomen: Soft, mildly tender across lower abdomen without rebound or guarding, non distended, normoactive bowel sounds  Back: No evidence of trauma or deformity  Extremities: No evidence of trauma or deformity, mild bilateral symmetric LE edema  Skin: Warm and dry, normal cap refill  Neuro: Alert and appropriate, face symmetric, normal speech, strength and sensation symmetric bilaterally, normal coordination  Psychiatric: Normal mood and affect      Diagnostic Study Results     Labs -     Recent Results (from the past 12 hour(s))   EKG, 12 LEAD, INITIAL    Collection Time: 02/22/22 10:44 AM   Result Value Ref Range    Ventricular Rate 51 BPM    Atrial Rate 51 BPM    P-R Interval 280 ms    QRS Duration 86 ms    Q-T Interval 406 ms    QTC Calculation (Bezet) 374 ms    Calculated P Axis 126 degrees    Calculated R Axis 39 degrees    Calculated T Axis 90 degrees    Diagnosis       Undetermined rhythm  Septal infarct , age undetermined  ST & T wave abnormality, consider lateral ischemia  Abnormal ECG  When compared with ECG of 24-NOV-2019 17:05,  Current undetermined rhythm precludes rhythm comparison, needs review  QRS duration has decreased  Nonspecific T wave abnormality, improved in Inferior leads  T wave inversion now evident in Lateral leads  QT has shortened     CBC WITH AUTOMATED DIFF    Collection Time: 02/22/22 10:45 AM   Result Value Ref Range    WBC 13.7 (H) 4.6 - 13.2 K/uL    RBC 3.54 (L) 4.20 - 5.30 M/uL    HGB 10.9 (L) 12.0 - 16.0 g/dL    HCT 35.4 35.0 - 45.0 %    .0 78.0 - 100.0 FL    MCH 30.8 24.0 - 34.0 PG    MCHC 30.8 (L) 31.0 - 37.0 g/dL    RDW 13.3 11.6 - 14.5 %    PLATELET 548 181 - 043 K/uL    MPV 9.6 9.2 - 11.8 FL    NRBC 0.0 0  WBC    ABSOLUTE NRBC 0.00 0.00 - 0.01 K/uL    NEUTROPHILS 84 (H) 40 - 73 %    LYMPHOCYTES 9 (L) 21 - 52 %    MONOCYTES 3 3 - 10 %    EOSINOPHILS 3 0 - 5 %    BASOPHILS 1 0 - 2 %    IMMATURE GRANULOCYTES 1 (H) 0.0 - 0.5 %    ABS. NEUTROPHILS 11.5 (H) 1.8 - 8.0 K/UL    ABS. LYMPHOCYTES 1.2 0.9 - 3.6 K/UL    ABS. MONOCYTES 0.5 0.05 - 1.2 K/UL    ABS. EOSINOPHILS 0.3 0.0 - 0.4 K/UL    ABS. BASOPHILS 0.1 0.0 - 0.1 K/UL    ABS. IMM. GRANS. 0.1 (H) 0.00 - 0.04 K/UL    DF AUTOMATED     METABOLIC PANEL, COMPREHENSIVE    Collection Time: 02/22/22 10:45 AM   Result Value Ref Range    Sodium 138 136 - 145 mmol/L    Potassium 4.5 3.5 - 5.5 mmol/L    Chloride 105 100 - 111 mmol/L    CO2 27 21 - 32 mmol/L    Anion gap 6 3.0 - 18 mmol/L    Glucose 142 (H) 74 - 99 mg/dL    BUN 35 (H) 7.0 - 18 MG/DL    Creatinine 1.81 (H) 0.6 - 1.3 MG/DL    BUN/Creatinine ratio 19 12 - 20      GFR est AA 32 (L) >60 ml/min/1.73m2    GFR est non-AA 26 (L) >60 ml/min/1.73m2    Calcium 9.4 8.5 - 10.1 MG/DL    Bilirubin, total 1.1 (H) 0.2 - 1.0 MG/DL    ALT (SGPT) 17 13 - 56 U/L    AST (SGOT) 20 10 - 38 U/L    Alk.  phosphatase 100 45 - 117 U/L    Protein, total 6.4 6.4 - 8.2 g/dL    Albumin 3.4 3.4 - 5.0 g/dL    Globulin 3.0 2.0 - 4.0 g/dL    A-G Ratio 1.1 0.8 - 1.7     LIPASE    Collection Time: 02/22/22 10:45 AM   Result Value Ref Range    Lipase 73 73 - 393 U/L   TROPONIN-HIGH SENSITIVITY    Collection Time: 02/22/22 10:45 AM   Result Value Ref Range    Troponin-High Sensitivity 14 0 - 54 ng/L   TROPONIN-HIGH SENSITIVITY    Collection Time: 02/22/22 12:14 PM   Result Value Ref Range    Troponin-High Sensitivity 12 0 - 54 ng/L   EKG, 12 LEAD, SUBSEQUENT    Collection Time: 02/22/22 12:17 PM   Result Value Ref Range    Ventricular Rate 52 BPM    Atrial Rate 52 BPM    P-R Interval 250 ms    QRS Duration 110 ms    Q-T Interval 450 ms    QTC Calculation (Bezet) 418 ms    Calculated P Axis 71 degrees    Calculated R Axis 48 degrees    Calculated T Axis 50 degrees    Diagnosis       Sinus bradycardia with 1st degree AV block  Septal infarct (cited on or before 22-FEB-2022)  Abnormal ECG  When compared with ECG of 22-FEB-2022 10:44,  Previous ECG has undetermined rhythm, needs review  QRS duration has increased  Serial changes of Septal infarct present     URINALYSIS W/ RFLX MICROSCOPIC    Collection Time: 02/22/22  1:30 PM   Result Value Ref Range    Color YELLOW      Appearance CLEAR      Specific gravity 1.008 1.005 - 1.030      pH (UA) 7.0 5.0 - 8.0      Protein TRACE (A) NEG mg/dL    Glucose Negative NEG mg/dL    Ketone Negative NEG mg/dL    Bilirubin Negative NEG      Blood Negative NEG      Urobilinogen 0.2 0.2 - 1.0 EU/dL    Nitrites Negative NEG      Leukocyte Esterase Negative NEG     URINE MICROSCOPIC ONLY    Collection Time: 02/22/22  1:30 PM   Result Value Ref Range    WBC 0 to 5 0 - 5 /hpf    RBC Negative 0 - 5 /hpf    Epithelial cells 0 to 3 0 - 5 /lpf    Bacteria Negative NEG /hpf       Radiologic Studies -   CT ABD PELV WO CONT   Final Result      No acute intra-abdominal abnormality. Diverticulosis. Rectal fecal impaction. Mild T12 compression fracture deformity, age indeterminate. CT HEAD WO CONT   Final Result      No acute intracranial abnormality.          XR CHEST PORT   Final Result      No acute findings in the chest.         CT Results  (Last 48 hours)               02/22/22 1202  CT ABD PELV WO CONT Final result    Impression:      No acute intra-abdominal abnormality. Diverticulosis. Rectal fecal impaction. Mild T12 compression fracture deformity, age indeterminate. Narrative:  EXAM: CT of the abdomen and pelvis       INDICATION: Pain. COMPARISON: 11/24/2019. TECHNIQUE: Axial CT imaging of the abdomen and pelvis was performed without   intravenous contrast. Multiplanar reformats were generated. One or more dose reduction techniques were used on this CT: automated exposure   control, adjustment of the mAs and/or kVp according to patient size, and   iterative reconstruction techniques. The specific techniques used on this CT   exam have been documented in the patient's electronic medical record. Digital   Imaging and Communications in Medicine (DICOM) format image data are available   to nonaffiliated external healthcare facilities or entities on a secure, media   free, reciprocally searchable basis with patient authorization for at least a   12-month period after this study. _______________       FINDINGS:       Lack of intravenous contrast and respiratory motion limit evaluation of   abdominal viscera. LOWER CHEST: Unremarkable. LIVER, BILIARY: Liver is normal. No biliary dilation. Cholecystectomy. PANCREAS: Normal.       SPLEEN: Normal.       ADRENALS: Stable right adrenal adenoma. KIDNEYS/URETERS/BLADDER: No hydronephrosis or renal calcification. PELVIC ORGANS: Unremarkable. VASCULATURE: Scattered vascular calcifications. LYMPH NODES: No enlarged lymph nodes. GASTROINTESTINAL TRACT: Small hiatal hernia. No bowel dilation or wall   thickening. Diverticulosis. Rectal fecal impaction. BONES: No acute or aggressive osseous abnormalities identified. Mild T12   compression fracture deformity, age indeterminate. OTHER: None.       _______________           02/22/22 1156  CT HEAD WO CONT Final result    Impression:      No acute intracranial abnormality. Narrative:  EXAM: CT head       INDICATION: Headache. COMPARISON: None. TECHNIQUE: Axial CT imaging of the head was performed without intravenous   contrast. Standard multiplanar coronal and sagittal reformatted images were   obtained and are included in interpretation. One or more dose reduction techniques were used on this CT: automated exposure   control, adjustment of the mAs and/or kVp according to patient size, and   iterative reconstruction techniques. The specific techniques used on this CT   exam have been documented in the patient's electronic medical record. Digital   Imaging and Communications in Medicine (DICOM) format image data are available   to nonaffiliated external healthcare facilities or entities on a secure, media   free, reciprocally searchable basis with patient authorization for at least a   12-month period after this study. _______________       FINDINGS:       BRAIN AND POSTERIOR FOSSA: Atrophy. Patchy periventricular, deep and subcortical   white matter hypoattenuation which is nonspecific but likely represents chronic   ischemic changes. No evidence of acute large vessel transcortical infarct or   acute parenchymal hemorrhage. No midline shift or hydrocephalus. EXTRA-AXIAL SPACES AND MENINGES: There are no abnormal extra-axial fluid   collections. CALVARIUM: Intact. SINUSES: Clear. OTHER: None.       _______________               CXR Results  (Last 48 hours)               02/22/22 1113  XR CHEST PORT Final result    Impression:      No acute findings in the chest.        Narrative:  EXAM: XR CHEST PORT       CLINICAL INDICATION/HISTORY: syncope   -Additional: None       COMPARISON: 2/14/2020       TECHNIQUE: Frontal view of the chest       _______________       FINDINGS:       HEART AND MEDIASTINUM: Normal cardiac size and mediastinal contours. LUNGS AND PLEURAL SPACES: Emphysema.  No focal pneumonic consolidation,   pneumothorax, or pleural effusion. BONY THORAX AND SOFT TISSUES: No acute osseous abnormality       _______________                 Medications given in the ED-  Medications   sodium chloride 0.9 % bolus infusion 500 mL (has no administration in time range)         Medical Decision Making   I am the first provider for this patient. I reviewed the vital signs, available nursing notes, past medical history, past surgical history, family history and social history. Vital Signs-Reviewed the patient's vital signs. Pulse Oximetry Analysis - 95% on room air, not hypoxic     Cardiac Monitor:  Rate: 59 bpm  Rhythm: Sinus bradycardia    EKG interpretation: (Preliminary)  EKG read by Dr. Radha Blue at 10:51 AM  Sinus bradycardia rate of 51 bpm, AZ interval 280 ms, QRS duration of 86 ms, significant artifact noted but morphology is overall unchanged when compared to prior from November 2019    Repeat EKG interpretation: (Preliminary)  EKG read by Dr. Radha Blue at 12:23 PM  Sinus bradycardia rate of 52 bpm, AZ interval 250 ms, QRS duration 110 ms, similar when compared to prior from earlier today    Records Reviewed: Nursing Notes, Old Medical Records and Previous electrocardiograms    Provider Notes (Medical Decision Making): Florian Mcginnis is a 80 y.o. female. Syncopal episode this morning. Patient states she has been struggling to have a bowel movement and sounds suspicious for vasovagal syncope. Daughter had reported some hypotensive episodes at home but her blood pressures remained stable here in the emergency department. CT was obtained of the abdomen and pelvis that showed rectal impaction but when I went into the room to address this patient had voluminous and bowel movements with relief of any impaction. Patient is able to ambulate with with a walker as per her baseline. Patient has had a couple recent falls and has a T12 fracture of indeterminate age on her CT. Discussed this finding with her daughter.   No focal neuro deficits on exam.  Labs are otherwise benign. Plan for discharge with primary care and cardiology follow-up with return precautions. Patient and her daughter understand and agree with this plan. Procedures:  Procedures    ED Course:   1:12 PM  Updated patient and her daughter on all results and plan. All questions answered. Upon arrival patient had large volume bowel movement. Diagnosis and Disposition     Critical Care: None    DISCHARGE NOTE:    Yesenia Score  results have been reviewed with her. She has been counseled regarding her diagnosis, treatment, and plan. She verbally conveys understanding and agreement of the signs, symptoms, diagnosis, treatment and prognosis and additionally agrees to follow up as discussed. She also agrees with the care-plan and conveys that all of her questions have been answered. I have also provided discharge instructions for her that include: educational information regarding their diagnosis and treatment, and list of reasons why they would want to return to the ED prior to their follow-up appointment, should her condition change. She has been provided with education for proper emergency department utilization. CLINICAL IMPRESSION:    1. Vasovagal syncope    2. Constipation, unspecified constipation type    3. Compression fracture of T12 vertebra, initial encounter ([facility]        PLAN:  1. D/C Home  2. Current Discharge Medication List        3.    Follow-up Information     Follow up With Specialties Details Why Contact Info    Wyatt Mcclellan MD Internal Medicine Schedule an appointment as soon as possible for a visit   Yogesh Lance [facility].  [phone]      Aruna Welch MD Orthopedic Surgery Schedule an appointment as soon as possible for a visit   [address]      Osmin Rajput MD Cardiology Schedule an appointment as soon as possible for a visit   [address] [facility] [address] [phone]      [facility] EMERGENCY DEPT Emergency Medicine  If symptoms worsen [address] [place] [id number]  [phone]        _______________________________      Please note that this dictation was completed with [employer], the computer voice recognition software. Quite often unanticipated grammatical, syntax, homophones, and other interpretive errors are inadvertently transcribed by the computer software. Please disregard these errors. Please excuse any errors that have escaped final proofreading.

## 2022-02-22 NOTE — ED TRIAGE NOTES
Pt armando to us by EMS. [facility] stated she normally has hypertention but within the last couple weeks bp has been low and pt has been taking all her bps meds/     Pt has been having episode of hypotension and dizziness. Pt is here because this morning she tried to get up to the bathroom and eyes started to roll in the back of her head per ems. And she became dizzy.

## 2022-03-18 PROBLEM — I10 HYPERTENSION: Status: ACTIVE | Noted: 2019-11-24

## 2022-03-18 PROBLEM — E87.6 HYPOKALEMIA: Status: ACTIVE | Noted: 2019-11-25

## 2022-03-18 PROBLEM — I50.43 ACUTE ON CHRONIC COMBINED SYSTOLIC AND DIASTOLIC CHF (CONGESTIVE HEART FAILURE) (HCC): Status: ACTIVE | Noted: 2019-11-26

## 2022-03-18 PROBLEM — R77.8 ELEVATED TROPONIN: Status: ACTIVE | Noted: 2019-11-24

## 2022-03-19 PROBLEM — R79.89 ELEVATED BRAIN NATRIURETIC PEPTIDE (BNP) LEVEL: Status: ACTIVE | Noted: 2019-11-24

## 2022-03-19 PROBLEM — N18.30 STAGE 3 CHRONIC KIDNEY DISEASE (HCC): Status: ACTIVE | Noted: 2019-11-24

## 2022-03-19 PROBLEM — E03.9 ADULT HYPOTHYROIDISM: Status: ACTIVE | Noted: 2019-11-24

## 2022-03-19 PROBLEM — R19.7 DIARRHEA: Status: ACTIVE | Noted: 2019-11-24

## 2022-03-19 PROBLEM — N39.0 UTI (URINARY TRACT INFECTION): Status: ACTIVE | Noted: 2019-11-24

## 2022-03-20 PROBLEM — J18.9 PNEUMONIA: Status: ACTIVE | Noted: 2019-11-24

## 2022-03-20 PROBLEM — E83.42 HYPOMAGNESEMIA: Status: ACTIVE | Noted: 2019-11-25
